# Patient Record
Sex: FEMALE | Race: WHITE | Employment: FULL TIME | URBAN - METROPOLITAN AREA
[De-identification: names, ages, dates, MRNs, and addresses within clinical notes are randomized per-mention and may not be internally consistent; named-entity substitution may affect disease eponyms.]

---

## 2017-01-09 ENCOUNTER — HOSPITAL ENCOUNTER (EMERGENCY)
Facility: CLINIC | Age: 47
Discharge: HOME | End: 2017-01-09
Attending: FAMILY MEDICINE | Admitting: FAMILY MEDICINE
Payer: COMMERCIAL

## 2017-01-09 VITALS — DIASTOLIC BLOOD PRESSURE: 83 MMHG | TEMPERATURE: 97.8 F | OXYGEN SATURATION: 97 % | SYSTOLIC BLOOD PRESSURE: 135 MMHG

## 2017-01-09 DIAGNOSIS — J32.9 SINUSITIS, UNSPECIFIED CHRONICITY, UNSPECIFIED LOCATION: ICD-10-CM

## 2017-01-09 PROCEDURE — 99212 OFFICE O/P EST SF 10 MIN: CPT

## 2017-01-09 PROCEDURE — 99213 OFFICE O/P EST LOW 20 MIN: CPT | Performed by: FAMILY MEDICINE

## 2017-01-09 RX ORDER — CIPROFLOXACIN 500 MG/1
500 TABLET, FILM COATED ORAL 2 TIMES DAILY
Qty: 28 TABLET | Refills: 0 | Status: SHIPPED | OUTPATIENT
Start: 2017-01-09 | End: 2017-01-23

## 2017-01-09 ASSESSMENT — ENCOUNTER SYMPTOMS
COUGH: 1
FACIAL SWELLING: 1

## 2017-01-09 NOTE — ED PROVIDER NOTES
History     Chief Complaint   Patient presents with     Facial Pain     HPI  Malka Ontiveros is a 46 year old female, past medical history is unremarkable, presents to the urgent care with approximate 7-10 days of facial fullness congestion, rhinorrhea especially in the a.m., sensation of postnasal drip.  Mild cough, no shortness of air no chest pain.     Active Ambulatory Problems     Diagnosis Date Noted     No Active Ambulatory Problems     Resolved Ambulatory Problems     Diagnosis Date Noted     No Resolved Ambulatory Problems     No Additional Past Medical History     History reviewed. No pertinent past surgical history.  Social History     Social History     Marital Status:      Spouse Name: N/A     Number of Children: N/A     Years of Education: N/A     Occupational History     Not on file.     Social History Main Topics     Smoking status: Current Every Day Smoker -- 0.50 packs/day     Smokeless tobacco: Never Used     Alcohol Use: Yes     Drug Use: No     Sexual Activity: Not on file     Other Topics Concern     Not on file     Social History Narrative     History reviewed. No pertinent family history.  No current facility-administered medications for this encounter.     Current Outpatient Prescriptions   Medication     ciprofloxacin (CIPRO) 500 MG tablet     albuterol (ALBUTEROL) 108 (90 BASE) MCG/ACT inhaler     NO ACTIVE MEDICATIONS      No Known Allergies    I have reviewed the Medications, Allergies, Past Medical and Surgical History, and Social History in the Epic system.    Review of Systems   HENT: Positive for congestion and facial swelling.    Respiratory: Positive for cough.    All other systems reviewed and are negative.      Physical Exam   BP: 135/83 mmHg  Heart Rate: 77  Temp: 97.8  F (36.6  C)  SpO2: 97 %  Physical Exam   Constitutional: She appears well-developed and well-nourished.   HENT:   Tenderness to palpation over the ethmoid/sphenoid area as well as the left maxillary  prominence   Eyes: Conjunctivae and EOM are normal. Pupils are equal, round, and reactive to light.   Neck: Normal range of motion. Neck supple.   Cardiovascular: Normal rate, regular rhythm, normal heart sounds and intact distal pulses.    Pulmonary/Chest: Effort normal and breath sounds normal.   Nursing note and vitals reviewed.      ED Course   Procedures             Critical Care time:  none               Labs Ordered and Resulted from Time of ED Arrival Up to the Time of Departure from the ED - No data to display    Assessments & Plan (with Medical Decision Making)   46-year-old female who presents with URI-type symptoms as described in the HPI.  Physical exam is consistent with sinusitis.  Plan for Cipro 500 twice a day ×2 weeks.  Push fluids, continue other supportive symptomatic measures like María Elena pot that she has been doing as well as oral decongestant.    Disclaimer: This note consists of symbols derived from keyboarding, dictation and/or voice recognition software. As a result, there may be errors in the script that have gone undetected. Please consider this when interpreting information found in this chart.      I have reviewed the nursing notes.    I have reviewed the findings, diagnosis, plan and need for follow up with the patient.    New Prescriptions    CIPROFLOXACIN (CIPRO) 500 MG TABLET    Take 1 tablet (500 mg) by mouth 2 times daily for 14 days       Final diagnoses:   Sinusitis, unspecified chronicity, unspecified location       1/9/2017   [facility] EMERGENCY DEPARTMENT      Francisco Nelson MD  01/09/17 8350

## 2017-01-09 NOTE — DISCHARGE INSTRUCTIONS
Understanding Sinus Problems    You don t often think about your sinuses until there s a problem. One day you realize you can t smell dinner cooking. Or you find you often have problems breathing through your nose.  Symptoms of sinus problems  Sinus problems can cause uncomfortable symptoms. Your nose may run constantly. You might have trouble sleeping at night. You may even lose your sense of smell. Other symptoms can include:    Nasal congestion    Fullness in ears    Green, yellow, or bloody drainage from the nose    Trouble tasting food    Frequent headaches    Facial pain    Cough  When sinuses are blocked  If something blocks the passages in the nose or sinuses, mucus can t drain. Mucus-filled sinuses often become infected.    Colds cause the lining of the nose and sinuses to swell and make extra mucus. A buildup of mucus can lead to a more serious infection.    Allergies irritate turbinates and other tissues. This causes swelling, which can cause a blockage. Over time, this irritation can also lead to sacs of swollen tissue (polyps).    Polyps may form in both the sinuses and nose. Polyps can grow large enough to clog nasal passages and block drainage.    A crooked (deviated) septum may block nasal passages. This is often the result of an injury.    0299-4208 [employer]. [address] [place] [address], [place] [address]. All rights reserved. This information is not intended as a substitute for professional medical care. Always follow your healthcare professional's instructions.      Cipro 500 mg twice daily ×14 days.  Continue other symptomatic measures.

## 2017-01-09 NOTE — ED AVS SNAPSHOT
[facility] Emergency Department    [address] [address]    Phone:  [phone]    Fax:  [phone]                                       Malka Ontiveros   MRN: [id number]    Department:  [facility] Emergency Department   Date of Visit:  1/9/2017           Patient Information     Date Of Birth          1970        Your diagnoses for this visit were:     Sinusitis, unspecified chronicity, unspecified location        You were seen by Francisco Nelson MD.      Follow-up Information     Schedule an appointment as soon as possible for a visit with Unassigned Prisca Marr MD.    Specialty:  Clinic    Why:  As needed, If symptoms worsen        Discharge Instructions         Understanding Sinus Problems    You don t often think about your sinuses until there s a problem. One day you realize you can t smell dinner cooking. Or you find you often have problems breathing through your nose.  Symptoms of sinus problems  Sinus problems can cause uncomfortable symptoms. Your nose may run constantly. You might have trouble sleeping at night. You may even lose your sense of smell. Other symptoms can include:    Nasal congestion    Fullness in ears    Green, yellow, or bloody drainage from the nose    Trouble tasting food    Frequent headaches    Facial pain    Cough  When sinuses are blocked  If something blocks the passages in the nose or sinuses, mucus can t drain. Mucus-filled sinuses often become infected.    Colds cause the lining of the nose and sinuses to swell and make extra mucus. A buildup of mucus can lead to a more serious infection.    Allergies irritate turbinates and other tissues. This causes swelling, which can cause a blockage. Over time, this irritation can also lead to sacs of swollen tissue (polyps).    Polyps may form in both the sinuses and nose. Polyps can grow large enough to clog nasal passages and block drainage.    A crooked (deviated) septum may block  nasal passages. This is often the result of an injury.    1289-3408 [employer]. [address] [place] [address], [place] [address]. All rights reserved. This information is not intended as a substitute for professional medical care. Always follow your healthcare professional's instructions.      Cipro 500 mg twice daily ×14 days.  Continue other symptomatic measures.        24 Hour Appointment Hotline       To make an appointment at any [facility], call [phone] ([phone]). If you don't have a family doctor or clinic, we will help you find one. [facility] are conveniently located to serve the needs of you and your family.             Review of your medicines      START taking        Dose / Directions Last dose taken    ciprofloxacin 500 MG tablet   Commonly known as:  CIPRO   Dose:  500 mg   Quantity:  28 tablet        Take 1 tablet (500 mg) by mouth 2 times daily for 14 days   Refills:  0          Our records show that you are taking the medicines listed below. If these are incorrect, please call your family doctor or clinic.        Dose / Directions Last dose taken    albuterol 108 (90 BASE) MCG/ACT Inhaler   Commonly known as:  albuterol   Dose:  2 puff   Quantity:  1 Inhaler        Inhale 2 puffs into the lungs every 4 hours as needed for shortness of breath / dyspnea   Refills:  0        NO ACTIVE MEDICATIONS        Refills:  0                Prescriptions were sent or printed at these locations (1 Prescription)                   [facility] [address] [place] [address]   [address] [place] [address]    Telephone:  [phone]   Fax:  [phone]   Hours:                  E-Prescribed (1 of 1)         ciprofloxacin (CIPRO) 500 MG tablet                Orders Needing Specimen Collection     None      Pending Results     No orders found from 1/8/2017 to 1/10/2017.            Pending Culture Results     No orders found from 1/8/2017 to 1/10/2017.           "   Test Results from your hospital stay            Thank you for choosing [facility]       Thank you for choosing [facility] for your care. Our goal is always to provide you with excellent care. Hearing back from our patients is one way we can continue to improve our services. Please take a few minutes to complete the written survey that you may receive in the mail after you visit with us. Thank you!        [facility] Information     [facility] lets you send messages to your doctor, view your test results, renew your prescriptions, schedule appointments and more. To sign up, go to [email or web address] . Click on \"Log in\" on the left side of the screen, which will take you to the Welcome page. Then click on \"Sign up Now\" on the right side of the page.     You will be asked to enter the access code listed below, as well as some personal information. Please follow the directions to create your username and password.     Your access code is: [email or web address]  Expires: 2017  3:33 PM     Your access code will  in 90 days. If you need help or a new code, please call your [facility] clinic or [phone].        Care EveryWhere ID     This is your Care EveryWhere ID. This could be used by other organizations to access your [facility] medical records  [id number]        After Visit Summary       This is your record. Keep this with you and show to your community pharmacist(s) and doctor(s) at your next visit.                  "

## 2017-01-09 NOTE — ED AVS SNAPSHOT
[facility] Emergency Department    [address] [address]    Phone:  [phone]    Fax:  [phone]                                       Malka Ontiveros   MRN: [id number]    Department:  [facility] Emergency Department   Date of Visit:  1/9/2017           After Visit Summary Signature Page     I have received my discharge instructions, and my questions have been answered. I have discussed any challenges I see with this plan with the nurse or doctor.    ..........................................................................................................................................  Patient/Patient Representative Signature      ..........................................................................................................................................  Patient Representative Print Name and Relationship to Patient    ..................................................               ................................................  Date                                            Time    ..........................................................................................................................................  Reviewed by Signature/Title    ...................................................              ..............................................  Date                                                            Time

## 2017-02-13 ENCOUNTER — OFFICE VISIT (OUTPATIENT)
Dept: FAMILY MEDICINE | Facility: CLINIC | Age: 47
End: 2017-02-13
Payer: COMMERCIAL

## 2017-02-13 ENCOUNTER — HOSPITAL ENCOUNTER (OUTPATIENT)
Dept: CT IMAGING | Facility: CLINIC | Age: 47
Discharge: HOME | End: 2017-02-13
Attending: INTERNAL MEDICINE | Admitting: INTERNAL MEDICINE
Payer: COMMERCIAL

## 2017-02-13 VITALS
WEIGHT: 181.6 LBS | BODY MASS INDEX: 35.65 KG/M2 | HEIGHT: 60 IN | HEART RATE: 70 BPM | TEMPERATURE: 98.2 F | DIASTOLIC BLOOD PRESSURE: 63 MMHG | OXYGEN SATURATION: 99 % | SYSTOLIC BLOOD PRESSURE: 135 MMHG

## 2017-02-13 DIAGNOSIS — M54.50 LOW BACK PAIN RADIATING TO RIGHT LEG: ICD-10-CM

## 2017-02-13 DIAGNOSIS — R10.84 ABDOMINAL PAIN, GENERALIZED: Primary | ICD-10-CM

## 2017-02-13 DIAGNOSIS — R10.84 ABDOMINAL PAIN, GENERALIZED: ICD-10-CM

## 2017-02-13 DIAGNOSIS — M79.604 LOW BACK PAIN RADIATING TO RIGHT LEG: ICD-10-CM

## 2017-02-13 LAB
ALBUMIN SERPL-MCNC: 4 G/DL (ref 3.4–5)
ALBUMIN UR-MCNC: NEGATIVE MG/DL
ALP SERPL-CCNC: 62 U/L (ref 40–150)
ALT SERPL W P-5'-P-CCNC: 28 U/L (ref 0–50)
ANION GAP SERPL CALCULATED.3IONS-SCNC: 9 MMOL/L (ref 3–14)
APPEARANCE UR: CLEAR
AST SERPL W P-5'-P-CCNC: 18 U/L (ref 0–45)
BILIRUB SERPL-MCNC: 0.9 MG/DL (ref 0.2–1.3)
BILIRUB UR QL STRIP: NEGATIVE
BUN SERPL-MCNC: 13 MG/DL (ref 7–30)
CALCIUM SERPL-MCNC: 8.6 MG/DL (ref 8.5–10.1)
CHLORIDE SERPL-SCNC: 104 MMOL/L (ref 94–109)
CO2 SERPL-SCNC: 22 MMOL/L (ref 20–32)
COLOR UR AUTO: YELLOW
CREAT SERPL-MCNC: 0.59 MG/DL (ref 0.52–1.04)
ERYTHROCYTE [DISTWIDTH] IN BLOOD BY AUTOMATED COUNT: 13.4 % (ref 10–15)
GFR SERPL CREATININE-BSD FRML MDRD: NORMAL ML/MIN/1.7M2
GLUCOSE SERPL-MCNC: 87 MG/DL (ref 70–99)
GLUCOSE UR STRIP-MCNC: NEGATIVE MG/DL
HCT VFR BLD AUTO: 37.8 % (ref 35–47)
HGB BLD-MCNC: 12.8 G/DL (ref 11.7–15.7)
HGB UR QL STRIP: NEGATIVE
KETONES UR STRIP-MCNC: NEGATIVE MG/DL
LEUKOCYTE ESTERASE UR QL STRIP: ABNORMAL
MCH RBC QN AUTO: 29.4 PG (ref 26.5–33)
MCHC RBC AUTO-ENTMCNC: 33.9 G/DL (ref 31.5–36.5)
MCV RBC AUTO: 87 FL (ref 78–100)
NITRATE UR QL: NEGATIVE
NON-SQ EPI CELLS #/AREA URNS LPF: NORMAL /LPF
PH UR STRIP: 5.5 PH (ref 5–7)
PLATELET # BLD AUTO: 270 10E9/L (ref 150–450)
POTASSIUM SERPL-SCNC: 3.7 MMOL/L (ref 3.4–5.3)
PROT SERPL-MCNC: 8 G/DL (ref 6.8–8.8)
RBC # BLD AUTO: 4.35 10E12/L (ref 3.8–5.2)
RBC #/AREA URNS AUTO: NORMAL /HPF (ref 0–2)
SODIUM SERPL-SCNC: 135 MMOL/L (ref 133–144)
SP GR UR STRIP: 1.02 (ref 1–1.03)
URN SPEC COLLECT METH UR: ABNORMAL
UROBILINOGEN UR STRIP-ACNC: 0.2 EU/DL (ref 0.2–1)
WBC # BLD AUTO: 10.8 10E9/L (ref 4–11)
WBC #/AREA URNS AUTO: NORMAL /HPF (ref 0–2)

## 2017-02-13 PROCEDURE — 25500064 ZZH RX 255 OP 636: Performed by: INTERNAL MEDICINE

## 2017-02-13 PROCEDURE — 99203 OFFICE O/P NEW LOW 30 MIN: CPT | Performed by: INTERNAL MEDICINE

## 2017-02-13 PROCEDURE — 36415 COLL VENOUS BLD VENIPUNCTURE: CPT | Performed by: INTERNAL MEDICINE

## 2017-02-13 PROCEDURE — 85027 COMPLETE CBC AUTOMATED: CPT | Performed by: INTERNAL MEDICINE

## 2017-02-13 PROCEDURE — 74177 CT ABD & PELVIS W/CONTRAST: CPT

## 2017-02-13 PROCEDURE — 80053 COMPREHEN METABOLIC PANEL: CPT | Performed by: INTERNAL MEDICINE

## 2017-02-13 PROCEDURE — 81001 URINALYSIS AUTO W/SCOPE: CPT | Performed by: INTERNAL MEDICINE

## 2017-02-13 PROCEDURE — 25000125 ZZHC RX 250: Performed by: INTERNAL MEDICINE

## 2017-02-13 RX ORDER — IOPAMIDOL 755 MG/ML
89 INJECTION, SOLUTION INTRAVASCULAR ONCE
Status: COMPLETED | OUTPATIENT
Start: 2017-02-13 | End: 2017-02-13

## 2017-02-13 RX ORDER — CYCLOBENZAPRINE HCL 5 MG
5 TABLET ORAL 3 TIMES DAILY PRN
Qty: 42 TABLET | Refills: 1 | Status: SHIPPED | OUTPATIENT
Start: 2017-02-13 | End: 2018-08-24

## 2017-02-13 RX ADMIN — SODIUM CHLORIDE 62 ML: 9 INJECTION, SOLUTION INTRAVENOUS at 15:30

## 2017-02-13 RX ADMIN — IOPAMIDOL 89 ML: 755 INJECTION, SOLUTION INTRAVENOUS at 15:30

## 2017-02-13 NOTE — PATIENT INSTRUCTIONS
We will start with checking some lab work and the CT scan of the abdomen.  If these are normal, we may next consider an MRI of the back and/or a colonoscopy.

## 2017-02-13 NOTE — PROGRESS NOTES
"  SUBJECTIVE:                                                    Malka Ontiveros is a 47 year old female who presents to clinic today for the following health issues:      ABDOMINAL and FLANK PAIN     Onset: x 3 days    Description:   Character: Sharp pain suprapubic at the onset,  And now dull ache in abdomin and rectally   Location: suprapubic region right flank.   Has had on-going back pain for years, this new abdominal pain has made that worse.   Radiation: Back and rectum     Intensity: 7/10 now, 10/10 at the onset.     Progression of Symptoms:  same and constant    Accompanying Signs & Symptoms:  Fever/Chills?: no   Gas/Bloating: no   Nausea: no   Vomitting: no   Diarrhea?: no   Constipation:no   Dysuria or Hematuria: YES - needed to \"push\" first urination at the onset of symptoms.   Has seen some blood in stool, slight amount recently and has some discomfort with BMs   History:   Trauma: no   Previous similar pain: no    Previous tests done: none    Precipitating factors:   Does the pain change with:     Food: no      BM: YES- some pain w/ Bowel movements, some history of hemorrhoid flare up.      Urination: no     Alleviating factors:  Certain sitting positions     Therapies Tried and outcome: ibuprofen - helped some.     LMP:  2 weeks ago        Back Pain       Description:   Location of pain:  left  Character of pain: sharp and dull ache  Pain radiation: radiates into the right buttocks  New numbness or weakness in legs, not attributed to pain:  no     Intensity: severe    History:   She's had right lower back pain for many months and it has been worse recently.  Uses ibuprofen prn.  Parents have a history of cancer, so she worries about getting cancer.       Accompanying Signs & Symptoms:  Risk of Fracture:  None  Risk of Cauda Equina:  None  Risk of Infection:  None  Risk of Cancer:  None           Problem list and histories reviewed & adjusted, as indicated.  Additional history: as " documented    Current Outpatient Prescriptions   Medication Sig Dispense Refill     cyclobenzaprine (FLEXERIL) 5 MG tablet Take 1 tablet (5 mg) by mouth 3 times daily as needed for muscle spasms 42 tablet 1     albuterol (ALBUTEROL) 108 (90 BASE) MCG/ACT inhaler Inhale 2 puffs into the lungs every 4 hours as needed for shortness of breath / dyspnea 1 Inhaler 0     NO ACTIVE MEDICATIONS        No Known Allergies    ROS:  Constitutional, MSK, gi and gu systems are negative, except as otherwise noted.    OBJECTIVE:                                                    /63  Pulse 70  Temp 98.2  F (36.8  C) (Tympanic)  Ht 5' (1.524 m)  Wt 181 lb 9.6 oz (82.4 kg)  SpO2 99%  BMI 35.47 kg/m2  Body mass index is 35.47 kg/(m^2).    GENERAL: healthy, alert and no distress  RESP: lungs clear to auscultation - no rales, rhonchi or wheezes  CV: regular rate and rhythm, normal S1 S2, no S3 or S4, no murmur, click or rub  ABDOMEN: soft, tender over the entire lower abdomen, no hepatosplenomegaly, no masses and bowel sounds normal  BACK: no CVA tenderness, Pain to palpation over lumbar spine and in right low back and upper right buttock  NEURO: Normal strength and tone in legs, normal light touch sensation, symmetric Patellar reflexes      Diagnostic Test Results:  Results for orders placed or performed in visit on 02/13/17 (from the past 24 hour(s))   *UA reflex to Microscopic and Culture ([facility] and [facility] (except [place] and [place])   Result Value Ref Range    Color Urine Yellow     Appearance Urine Clear     Glucose Urine Negative NEG mg/dL    Bilirubin Urine Negative NEG    Ketones Urine Negative NEG mg/dL    Specific Gravity Urine 1.020 1.003 - 1.035    Blood Urine Negative NEG    pH Urine 5.5 5.0 - 7.0 pH    Protein Albumin Urine Negative NEG mg/dL    Urobilinogen Urine 0.2 0.2 - 1.0 EU/dL    Nitrite Urine Negative NEG    Leukocyte Esterase Urine Trace (A) NEG    Source Midstream Urine    Urine  Microscopic   Result Value Ref Range    WBC Urine O - 2 0 - 2 /HPF    RBC Urine O - 2 0 - 2 /HPF    Squamous Epithelial /LPF Urine Few FEW /LPF        ASSESSMENT/PLAN:                                                        1. Lower abdominal pain    She has moderate tenderness in the entire lower abdomen on exam.  Had had some blood in the stool, but has a history of hemorrhoids so this is the most likely etiology.  Will check CBC, CMP, and CT.  If normal, may consider colonoscopy for further eval of hematochezia.     - *UA reflex to Microscopic and Culture ([facility] and [facility] (except [place] and [place])  - Urine Microscopic  - Comprehensive metabolic panel  - CBC with platelets  - CT Abdomen Pelvis w Contrast; Future    2. Low back pain radiating to right leg    Likely due to sciatica.  Will try some Flexeril to see if muscle spasm contributing.  If not improving, would check lumbar MRI.  I discussed waiting a month prior to proceeding with MRI, but she would like it done immediately.  Asked her to at least give the Flexeril a try for a few days.    - cyclobenzaprine (FLEXERIL) 5 MG tablet; Take 1 tablet (5 mg) by mouth 3 times daily as needed for muscle spasms  Dispense: 42 tablet; Refill: 1      Micah Mcdowell MD  [facility]

## 2017-02-13 NOTE — NURSING NOTE
Chief Complaint   Patient presents with     Abdominal Pain     x 3 days,        Initial /63  Pulse 70  Temp 98.2  F (36.8  C) (Tympanic)  Ht 5' (1.524 m)  Wt 181 lb 9.6 oz (82.4 kg)  SpO2 99%  BMI 35.47 kg/m2 Estimated body mass index is 35.47 kg/(m^2) as calculated from the following:    Height as of this encounter: 5' (1.524 m).    Weight as of this encounter: 181 lb 9.6 oz (82.4 kg).  Medication Reconciliation: paulino GARCIA CMA (AAMA)

## 2017-02-13 NOTE — LETTER
[facility]  [address] [place] [address]  Phone: [phone]    February 13, 2017    Malka Ontiveros  [address] [place] [address]          Dear Ms. Ontiveros,    The results of your recent lab tests were within normal limits.   Labs were normal (blood counts, electrolytes, kidney and liver function).   Component      Latest Ref Rng & Units 2/13/2017   Sodium      133 - 144 mmol/L 135   Potassium      3.4 - 5.3 mmol/L 3.7   Chloride      94 - 109 mmol/L 104   Carbon Dioxide      20 - 32 mmol/L 22   Anion Gap      3 - 14 mmol/L 9   Glucose      70 - 99 mg/dL 87   Urea Nitrogen      7 - 30 mg/dL 13   Creatinine      0.52 - 1.04 mg/dL 0.59   GFR Estimate      >60 mL/min/1.7m2 >90 . . .   GFR Estimate If Black      >60 mL/min/1.7m2 >90 . . .   Calcium      8.5 - 10.1 mg/dL 8.6   Bilirubin Total      0.2 - 1.3 mg/dL 0.9   Albumin      3.4 - 5.0 g/dL 4.0   Protein Total      6.8 - 8.8 g/dL 8.0   Alkaline Phosphatase      40 - 150 U/L 62   ALT      0 - 50 U/L 28   AST      0 - 45 U/L 18   WBC      4.0 - 11.0 10e9/L 10.8   RBC Count      3.8 - 5.2 10e12/L 4.35   Hemoglobin      11.7 - 15.7 g/dL 12.8   Hematocrit      35.0 - 47.0 % 37.8   MCV      78 - 100 fl 87   MCH      26.5 - 33.0 pg 29.4   MCHC      31.5 - 36.5 g/dL 33.9   RDW      10.0 - 15.0 % 13.4   Platelet Count      150 - 450 10e9/L 270     If you have any further questions or problems, please contact our office.    Sincerely,      Micah Mcdowell MD/ [place]

## 2017-02-13 NOTE — MR AVS SNAPSHOT
After Visit Summary   2/13/2017    Malka Ontiveros    MRN: [id number]           Patient Information     Date Of Birth          1970        Visit Information        Provider Department      2/13/2017 9:20 AM Micah Mcdowell MD [facility]        Today's Diagnoses     Lower abdominal pain    -  1    Low back pain radiating to right leg          Care Instructions    We will start with checking some lab work and the CT scan of the abdomen.  If these are normal, we may next consider an MRI of the back and/or a colonoscopy.        Follow-ups after your visit        Your next 10 appointments already scheduled     Feb 13, 2017  3:15 PM CST   CT ABDOMEN PELVIS W CONTRAST with WYCT1   [facility] ([facility])    [address] [address] [phone]           Please bring any scans or X-rays taken at other hospitals, if similar tests were done. Also bring a list of your medicines, including vitamins, minerals and over-the-counter drugs. It is safest to leave personal items at home.  Be sure to tell your doctor:   If you have any allergies.   If there s any chance you are pregnant.   If you are breastfeeding.   If you have any special needs.  You may have contrast for this exam. To prepare:   Do not eat or drink for 2 hours before your exam. If you need to take medicine, you may take it with small sips of water. (We may ask you to take liquid medicine as well.)   The day before your exam, drink extra fluids at least six 8-ounce glasses (unless your doctor tells you to restrict your fluids).  Patients over 70 or patients with diabetes or kidney problems:   If you haven t had a blood test (creatinine test) within the last 30 days, go to your clinic or Diagnostic Imaging Department for this test.  If you have diabetes:   If your kidney function is normal, continue taking your metformin (Avandamet, Glucophage, Glucovance, Metaglip) on the day of your  "exam.   If your kidney function is abnormal, wait 48 hours before restarting this medicine.  You will have oral contrast for this exam:   You will drink the contrast at home. Get this from your clinic or Diagnostic Imaging Department. Please follow the directions given.  Please wear loose clothing, such as a sweat suit or jogging clothes. Avoid snaps, zippers and other metal. We may ask you to undress and put on a hospital gown.  If you have any questions, please call the Imaging Department where you will have your exam.              Future tests that were ordered for you today     Open Future Orders        Priority Expected Expires Ordered    CT Abdomen Pelvis w Contrast Routine  2/13/2018 2/13/2017            Who to contact     If you have questions or need follow up information about today's clinic visit or your schedule please contact [facility] directly at [phone].  Normal or non-critical lab and imaging results will be communicated to you by Uniqueduhart, letter or phone within 4 business days after the clinic has received the results. If you do not hear from us within 7 days, please contact the clinic through Uniqueduhart or phone. If you have a critical or abnormal lab result, we will notify you by phone as soon as possible.  Submit refill requests through Hamstersoft or call your pharmacy and they will forward the refill request to us. Please allow 3 business days for your refill to be completed.          Additional Information About Your Visit        UniqueduharQuote Roller Information     Hamstersoft lets you send messages to your doctor, view your test results, renew your prescriptions, schedule appointments and more. To sign up, go to [email or web address] . Click on \"Log in\" on the left side of the screen, which will take you to the Welcome page. Then click on \"Sign up Now\" on the right side of the page.     You will be asked to enter the access code listed below, as well as some personal information. Please " follow the directions to create your username and password.     Your access code is: [email or web address]  Expires: 2017  3:33 PM     Your access code will  in 90 days. If you need help or a new code, please call your [place] clinic or [phone].        Care EveryWhere ID     This is your Care EveryWhere ID. This could be used by other organizations to access your [place] medical records  [id number]        Your Vitals Were     Pulse Temperature Height Pulse Oximetry BMI (Body Mass Index)       70 98.2  F (36.8  C) (Tympanic) 5' (1.524 m) 99% 35.47 kg/m2        Blood Pressure from Last 3 Encounters:   17 135/63   17 135/83   10/03/16 (!) 145/91    Weight from Last 3 Encounters:   17 181 lb 9.6 oz (82.4 kg)              We Performed the Following     *UA reflex to Microscopic and Culture ([facility] and [facility] (except [place] and [place])     CBC with platelets     Comprehensive metabolic panel     Urine Microscopic          Today's Medication Changes          These changes are accurate as of: 17 10:32 AM.  If you have any questions, ask your nurse or doctor.               Start taking these medicines.        Dose/Directions    cyclobenzaprine 5 MG tablet   Commonly known as:  FLEXERIL   Used for:  Low back pain radiating to right leg   Started by:  Micah Mcdowell MD        Dose:  5 mg   Take 1 tablet (5 mg) by mouth 3 times daily as needed for muscle spasms   Quantity:  42 tablet   Refills:  1            Where to get your medicines      These medications were sent to [facility] [address]  [address] [place] [address]     Phone:  [phone]     cyclobenzaprine 5 MG tablet                Primary Care Provider    Clinic Unassigned Carter Stafford MD       No address on file        Thank you!     Thank you for choosing [facility]  for your care. Our goal is always to provide you with excellent care. Hearing back  from our patients is one way we can continue to improve our services. Please take a few minutes to complete the written survey that you may receive in the mail after your visit with us. Thank you!             Your Updated Medication List - Protect others around you: Learn how to safely use, store and throw away your medicines at www.disposemymeds.org.          This list is accurate as of: 2/13/17 10:32 AM.  Always use your most recent med list.                   Brand Name Dispense Instructions for use    albuterol 108 (90 BASE) MCG/ACT Inhaler    albuterol    1 Inhaler    Inhale 2 puffs into the lungs every 4 hours as needed for shortness of breath / dyspnea       cyclobenzaprine 5 MG tablet    FLEXERIL    42 tablet    Take 1 tablet (5 mg) by mouth 3 times daily as needed for muscle spasms       NO ACTIVE MEDICATIONS

## 2017-02-14 ENCOUNTER — TELEPHONE (OUTPATIENT)
Dept: FAMILY MEDICINE | Facility: CLINIC | Age: 47
End: 2017-02-14

## 2017-02-14 DIAGNOSIS — R10.84 ABDOMINAL PAIN, GENERALIZED: Primary | ICD-10-CM

## 2017-02-14 DIAGNOSIS — M54.41 ACUTE BILATERAL LOW BACK PAIN WITH RIGHT-SIDED SCIATICA: ICD-10-CM

## 2017-02-14 DIAGNOSIS — M54.42 ACUTE BILATERAL LOW BACK PAIN WITH LEFT-SIDED SCIATICA: ICD-10-CM

## 2017-02-14 RX ORDER — TRAMADOL HYDROCHLORIDE 50 MG/1
50-100 TABLET ORAL EVERY 6 HOURS PRN
Qty: 20 TABLET | Refills: 0 | Status: SHIPPED | OUTPATIENT
Start: 2017-02-14 | End: 2018-08-24

## 2017-02-14 NOTE — TELEPHONE ENCOUNTER
"I called her back and reviewed the CT report/ result note per her request, (see note below) .     \"I don't need another prescription , I need to know what is wrong with my back, \"  \"the pain in my back is worse today, I woke up like that. \" \"the first pill just made me sleepy. \"  \"Do I need to see a back doctor, or what do I do next? \"  \"would the cyst be pushing on a nerve? \"    She is at work, stands, and walks at a computer and says she \" has to be at work. \"    Dr Mcdowell, what to recommend?   Haley Grewal RNC            "

## 2017-02-14 NOTE — TELEPHONE ENCOUNTER
We can try some tramadol for the pain.  I have placed a prescription in my office outbasket to be faxed over.  If still not improved in a few days, she should let us know.     Micah Mcdowell MD

## 2017-02-14 NOTE — TELEPHONE ENCOUNTER
Left detailed message per her ok we do so below. Advised Dr Mcdowell has ordered the MRI of her back and she can call [phone] to schedule.   Haley Grewal RNC

## 2017-02-14 NOTE — TELEPHONE ENCOUNTER
Back MRI ordered though I feel that this is premature- please let her know that she can schedule it.  Thanks.    Micah Mcdowell MD

## 2017-02-14 NOTE — TELEPHONE ENCOUNTER
CSS, please call her/ advise her per below when you have the tramadol hard copy Rx. Thanks,   Haley Grewal RNC

## 2017-02-14 NOTE — TELEPHONE ENCOUNTER
Reason for Call:  Other more pain today    Detailed comments: pt calling stating she was seen yesterday, had a ct and diagnosed with a left ovarian cyst. She is in more pain today than she was yesterday when she see Dr Mcdowell. She states she know she wanted her to try the flexeril for a few days but she said all that does is make her tired and does nothing for the pain. She said today the pain is on the right side as well. She is wondering what she should do.    Phone Number Patient can be reached at: Cell number on file:    Telephone Information:   Mobile [phone]       Best Time: any    Can we leave a detailed message on this number? YES    Call taken on 2/14/2017 at 9:36 AM by Kirsty Davis

## 2017-02-14 NOTE — TELEPHONE ENCOUNTER
Called pt and let her know that her Rx is at  at is ready for    And she is wanting to speak to RN cause she has lots of questions regarding   Her CT and size of cysts and could this be the cause of pain?   Pt does not know if she wants medication but it is at .    [facility] [place]

## 2017-02-17 ENCOUNTER — HOSPITAL ENCOUNTER (OUTPATIENT)
Dept: MRI IMAGING | Facility: CLINIC | Age: 47
Discharge: HOME | End: 2017-02-17
Attending: INTERNAL MEDICINE | Admitting: INTERNAL MEDICINE
Payer: COMMERCIAL

## 2017-02-17 DIAGNOSIS — M54.41 ACUTE BILATERAL LOW BACK PAIN WITH RIGHT-SIDED SCIATICA: ICD-10-CM

## 2017-02-17 PROCEDURE — 72148 MRI LUMBAR SPINE W/O DYE: CPT

## 2017-07-26 NOTE — TELEPHONE ENCOUNTER
Tramadol rx from 2/14/17 never picked up from . Paper rx was torn up and shredded. Angus GARCÍA CMA

## 2018-08-24 ENCOUNTER — HOSPITAL ENCOUNTER (EMERGENCY)
Facility: CLINIC | Age: 48
Discharge: HOME | End: 2018-08-24
Attending: NURSE PRACTITIONER | Admitting: NURSE PRACTITIONER
Payer: COMMERCIAL

## 2018-08-24 VITALS
RESPIRATION RATE: 12 BRPM | WEIGHT: 140 LBS | SYSTOLIC BLOOD PRESSURE: 174 MMHG | OXYGEN SATURATION: 100 % | HEIGHT: 60 IN | DIASTOLIC BLOOD PRESSURE: 94 MMHG | BODY MASS INDEX: 27.48 KG/M2 | HEART RATE: 82 BPM | TEMPERATURE: 97.3 F

## 2018-08-24 DIAGNOSIS — R19.7 DIARRHEA, UNSPECIFIED TYPE: ICD-10-CM

## 2018-08-24 DIAGNOSIS — R42 DIZZINESS: ICD-10-CM

## 2018-08-24 LAB
ALBUMIN UR-MCNC: NEGATIVE MG/DL
ANION GAP SERPL CALCULATED.3IONS-SCNC: 5 MMOL/L (ref 3–14)
APPEARANCE UR: CLEAR
BACTERIA #/AREA URNS HPF: ABNORMAL /HPF
BASOPHILS # BLD AUTO: 0.1 10E9/L (ref 0–0.2)
BASOPHILS NFR BLD AUTO: 0.6 %
BILIRUB UR QL STRIP: NEGATIVE
BUN SERPL-MCNC: 10 MG/DL (ref 7–30)
CALCIUM SERPL-MCNC: 8.9 MG/DL (ref 8.5–10.1)
CHLORIDE SERPL-SCNC: 109 MMOL/L (ref 94–109)
CO2 SERPL-SCNC: 26 MMOL/L (ref 20–32)
COLOR UR AUTO: ABNORMAL
CREAT SERPL-MCNC: 0.57 MG/DL (ref 0.52–1.04)
DIFFERENTIAL METHOD BLD: NORMAL
EOSINOPHIL # BLD AUTO: 0.2 10E9/L (ref 0–0.7)
EOSINOPHIL NFR BLD AUTO: 2.5 %
ERYTHROCYTE [DISTWIDTH] IN BLOOD BY AUTOMATED COUNT: 12.5 % (ref 10–15)
GFR SERPL CREATININE-BSD FRML MDRD: >90 ML/MIN/1.7M2
GLUCOSE SERPL-MCNC: 91 MG/DL (ref 70–99)
GLUCOSE UR STRIP-MCNC: NEGATIVE MG/DL
HCG UR QL: NEGATIVE
HCT VFR BLD AUTO: 41.5 % (ref 35–47)
HGB BLD-MCNC: 14 G/DL (ref 11.7–15.7)
HGB UR QL STRIP: NEGATIVE
IMM GRANULOCYTES # BLD: 0 10E9/L (ref 0–0.4)
IMM GRANULOCYTES NFR BLD: 0.5 %
KETONES UR STRIP-MCNC: NEGATIVE MG/DL
LEUKOCYTE ESTERASE UR QL STRIP: NEGATIVE
LYMPHOCYTES # BLD AUTO: 2.8 10E9/L (ref 0.8–5.3)
LYMPHOCYTES NFR BLD AUTO: 33.4 %
MCH RBC QN AUTO: 30.4 PG (ref 26.5–33)
MCHC RBC AUTO-ENTMCNC: 33.7 G/DL (ref 31.5–36.5)
MCV RBC AUTO: 90 FL (ref 78–100)
MONOCYTES # BLD AUTO: 0.7 10E9/L (ref 0–1.3)
MONOCYTES NFR BLD AUTO: 8 %
NEUTROPHILS # BLD AUTO: 4.6 10E9/L (ref 1.6–8.3)
NEUTROPHILS NFR BLD AUTO: 55 %
NITRATE UR QL: NEGATIVE
NRBC # BLD AUTO: 0 10*3/UL
NRBC BLD AUTO-RTO: 0 /100
PH UR STRIP: 7 PH (ref 5–7)
PLATELET # BLD AUTO: 219 10E9/L (ref 150–450)
POTASSIUM SERPL-SCNC: 3.5 MMOL/L (ref 3.4–5.3)
RBC # BLD AUTO: 4.61 10E12/L (ref 3.8–5.2)
RBC #/AREA URNS AUTO: <1 /HPF (ref 0–2)
SODIUM SERPL-SCNC: 140 MMOL/L (ref 133–144)
SOURCE: ABNORMAL
SP GR UR STRIP: 1 (ref 1–1.03)
SQUAMOUS #/AREA URNS AUTO: 2 /HPF (ref 0–1)
UROBILINOGEN UR STRIP-MCNC: 0 MG/DL (ref 0–2)
WBC # BLD AUTO: 8.4 10E9/L (ref 4–11)
WBC #/AREA URNS AUTO: <1 /HPF (ref 0–5)

## 2018-08-24 PROCEDURE — 99283 EMERGENCY DEPT VISIT LOW MDM: CPT | Mod: Z6 | Performed by: NURSE PRACTITIONER

## 2018-08-24 PROCEDURE — 85025 COMPLETE CBC W/AUTO DIFF WBC: CPT | Performed by: NURSE PRACTITIONER

## 2018-08-24 PROCEDURE — 87086 URINE CULTURE/COLONY COUNT: CPT | Performed by: NURSE PRACTITIONER

## 2018-08-24 PROCEDURE — 99283 EMERGENCY DEPT VISIT LOW MDM: CPT

## 2018-08-24 PROCEDURE — 80048 BASIC METABOLIC PNL TOTAL CA: CPT | Performed by: NURSE PRACTITIONER

## 2018-08-24 PROCEDURE — 81025 URINE PREGNANCY TEST: CPT | Performed by: NURSE PRACTITIONER

## 2018-08-24 PROCEDURE — 81001 URINALYSIS AUTO W/SCOPE: CPT | Performed by: NURSE PRACTITIONER

## 2018-08-24 PROCEDURE — 25000125 ZZHC RX 250: Performed by: EMERGENCY MEDICINE

## 2018-08-24 RX ORDER — ONDANSETRON 4 MG/1
4 TABLET, ORALLY DISINTEGRATING ORAL ONCE
Status: COMPLETED | OUTPATIENT
Start: 2018-08-24 | End: 2018-08-24

## 2018-08-24 RX ORDER — ONDANSETRON 4 MG/1
4-8 TABLET, ORALLY DISINTEGRATING ORAL EVERY 8 HOURS PRN
Qty: 10 TABLET | Refills: 0 | Status: SHIPPED | OUTPATIENT
Start: 2018-08-24 | End: 2018-08-27

## 2018-08-24 RX ADMIN — ONDANSETRON 4 MG: 4 TABLET, ORALLY DISINTEGRATING ORAL at 09:21

## 2018-08-24 ASSESSMENT — ENCOUNTER SYMPTOMS
COUGH: 0
NAUSEA: 1
ABDOMINAL PAIN: 0
BLOOD IN STOOL: 0
NERVOUS/ANXIOUS: 0
NUMBNESS: 0
DIARRHEA: 1
BACK PAIN: 0
JOINT SWELLING: 0
CONSTIPATION: 0
EYE REDNESS: 0
CONFUSION: 0
EYE PAIN: 0
DYSURIA: 0
HEMATURIA: 0
FEVER: 0
EYE DISCHARGE: 0
HEADACHES: 1
VOMITING: 0
DIFFICULTY URINATING: 0
SEIZURES: 0
WHEEZING: 0
SHORTNESS OF BREATH: 0
DIZZINESS: 1
WOUND: 0
NECK PAIN: 1
SORE THROAT: 0

## 2018-08-24 NOTE — ED NOTES
"Up at 0530 with dizziness, has had vertigo in the past and states \"it feels the same\" did go into work but unable to stay due to dizziness, and nausea. 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 Zofran given in ER. She stated last time she was sent to therapy for there vertigo. Otherwise everything has been normal, eating and drinking good, no CP, SOB. Here today with friend.   "

## 2018-08-24 NOTE — ED AVS SNAPSHOT
[facility]    [address] [address]    Phone:  [phone]    Fax:  [phone]                                       Malka Ontiveros   MRN: [id number]    Department:  [facility]   Date of Visit:  8/24/2018           After Visit Summary Signature Page     I have received my discharge instructions, and my questions have been answered. I have discussed any challenges I see with this plan with the nurse or doctor.    ..........................................................................................................................................  Patient/Patient Representative Signature      ..........................................................................................................................................  Patient Representative Print Name and Relationship to Patient    ..................................................               ................................................  Date                                            Time    ..........................................................................................................................................  Reviewed by Signature/Title    ...................................................              ..............................................  Date                                                            Time          22EPIC Rev 08/18

## 2018-08-24 NOTE — DISCHARGE INSTRUCTIONS
Take the zofran  (1-2 tabletes ) every 8 hours as needed for nausea  Rest and drink plenty of fluids  Follow up if ongoing or persistent symptoms greater than 5 days  Possible Causes of Dizziness or Fainting    Dizziness and fainting can have many causes. Below are some examples of possible causes your healthcare provider will look to rule out.  Benign paroxysmal positional vertigo (BPPV)  BPPV results when calcium crystals inside the inner ear shift into the wrong position. BPPV causes episodes of vertigo (a spinning sensation). Episodes most often happen when the head is moved in a certain way. This is more common in people 65 and older.   Infection or inflammation  The semicircular canals of the ear may become infected or inflamed. In this case, they can send the wrong balance signals. This can cause vertigo.  Meniere disease  Meniere disease happens when there is too much fluid in the semicircular canals. This can cause vertigo. It also can cause hearing problems and buzzing or ringing in the ears (called tinnitus). You may also have a feeling of pressure or fullness in the ear.  Syncope  Syncope is fainting that happens when the brain doesn t get enough oxygen-rich blood. It can be caused by low heart rate or low blood pressure. This is called vasovagal syncope. It can also be caused by sitting or standing up too quickly. This is called orthostatic hypotension. Syncope may also be due to a heart valve problem, an abnormal heart rhythm, or other heart problems. Dizziness can also happen from stroke, hemorrhage in the brain, or other problems in the brain. Your healthcare provider may do certain tests to rule out these conditions.  Other causes  Other causes include:    Medicines. Certain medicines can cause dizziness and even fainting. In some cases, stopping a medicine too quickly can lead to withdrawal symptoms, including dizziness and fainting.    Anxiety. Being anxious can lead to breathing changes, such  as hyperventilation. These can lead to dizziness and fainting.  Additional causes for dizziness and fainting also exist. Talk to your healthcare provider for more information.     Date Last Reviewed: 10/6/2015    1748-9542 [employer]. [address] [place] [address], [place] [address]. All rights reserved. This information is not intended as a substitute for professional medical care. Always follow your healthcare professional's instructions.          Treating Diarrhea    Diarrhea happens when you have loose, watery, or frequent bowel movements. It is a common problem with many causes. Most cases of diarrhea clear up on their own. But certain cases may need treatment. Be sure to see your healthcare provider if your symptoms do not improve within a few days.  Getting relief  Treatment of diarrhea depends on its cause. Diarrhea caused by bacterial or parasite infection is often treated with antibiotics. Diarrhea caused by other factors, such as a stomach virus, often improves with simple home treatment. The tips below may also help relieve your symptoms.    Drink plenty of fluids. This helps prevent too much fluid loss (dehydration). Water, clear soups, and electrolyte solutions are good choices. Avoid alcohol, coffee, tea, and milk. These can irritate your intestines and make symptoms worse.    Suck on ice chips if drinking makes you queasy.    Return to your normal diet slowly. You may want to eat bland foods at first, such as rice and toast. Also, you may need to avoid certain foods for a while, such as dairy products. These can make symptoms worse. Ask your healthcare provider if there are any other foods you should avoid.    If you were prescribed antibiotics, take them as directed.    Do not take anti-diarrhea medicines without asking your healthcare provider first.  Call your healthcare provider   Call your healthcare provider if you have any of the following:     A fever of 100.4 F (38.0 C) or higher, or  as directed by your healthcare provider    Severe pain    Worsening diarrhea or diarrhea for more than 2 days    Bloody vomit or stool    Signs of dehydration (dizziness, dry mouth and tongue, rapid pulse, dark urine)  Date Last Reviewed: 7/1/2016 2000-2017 [employer]. [address] [place] [address]. All rights reserved. This information is not intended as a substitute for professional medical care. Always follow your healthcare professional's instructions.

## 2018-08-24 NOTE — ED AVS SNAPSHOT
[facility] Emergency Department    [address] [address]    Phone:  [phone]    Fax:  [phone]                                       Malka Ontiveros   MRN: [id number]    Department:  [facility] Emergency Department   Date of Visit:  8/24/2018           Patient Information     Date Of Birth          1970        Your diagnoses for this visit were:     Dizziness     Diarrhea, unspecified type        You were seen by Birgit Rocha APRN CNP.      Follow-up Information     Please follow up.    Why:  As needed, If symptoms worsen        Discharge Instructions         Take the zofran  (1-2 tabletes ) every 8 hours as needed for nausea  Rest and drink plenty of fluids  Follow up if ongoing or persistent symptoms greater than 5 days  Possible Causes of Dizziness or Fainting    Dizziness and fainting can have many causes. Below are some examples of possible causes your healthcare provider will look to rule out.  Benign paroxysmal positional vertigo (BPPV)  BPPV results when calcium crystals inside the inner ear shift into the wrong position. BPPV causes episodes of vertigo (a spinning sensation). Episodes most often happen when the head is moved in a certain way. This is more common in people 65 and older.   Infection or inflammation  The semicircular canals of the ear may become infected or inflamed. In this case, they can send the wrong balance signals. This can cause vertigo.  Meniere disease  Meniere disease happens when there is too much fluid in the semicircular canals. This can cause vertigo. It also can cause hearing problems and buzzing or ringing in the ears (called tinnitus). You may also have a feeling of pressure or fullness in the ear.  Syncope  Syncope is fainting that happens when the brain doesn t get enough oxygen-rich blood. It can be caused by low heart rate or low blood pressure. This is called vasovagal syncope. It can also be caused by sitting or  standing up too quickly. This is called orthostatic hypotension. Syncope may also be due to a heart valve problem, an abnormal heart rhythm, or other heart problems. Dizziness can also happen from stroke, hemorrhage in the brain, or other problems in the brain. Your healthcare provider may do certain tests to rule out these conditions.  Other causes  Other causes include:    Medicines. Certain medicines can cause dizziness and even fainting. In some cases, stopping a medicine too quickly can lead to withdrawal symptoms, including dizziness and fainting.    Anxiety. Being anxious can lead to breathing changes, such as hyperventilation. These can lead to dizziness and fainting.  Additional causes for dizziness and fainting also exist. Talk to your healthcare provider for more information.     Date Last Reviewed: 10/6/2015    9246-5685 [employer]. [address] [place] [address], [place] [address]. All rights reserved. This information is not intended as a substitute for professional medical care. Always follow your healthcare professional's instructions.          Treating Diarrhea    Diarrhea happens when you have loose, watery, or frequent bowel movements. It is a common problem with many causes. Most cases of diarrhea clear up on their own. But certain cases may need treatment. Be sure to see your healthcare provider if your symptoms do not improve within a few days.  Getting relief  Treatment of diarrhea depends on its cause. Diarrhea caused by bacterial or parasite infection is often treated with antibiotics. Diarrhea caused by other factors, such as a stomach virus, often improves with simple home treatment. The tips below may also help relieve your symptoms.    Drink plenty of fluids. This helps prevent too much fluid loss (dehydration). Water, clear soups, and electrolyte solutions are good choices. Avoid alcohol, coffee, tea, and milk. These can irritate your intestines and make symptoms worse.    Suck  on ice chips if drinking makes you queasy.    Return to your normal diet slowly. You may want to eat bland foods at first, such as rice and toast. Also, you may need to avoid certain foods for a while, such as dairy products. These can make symptoms worse. Ask your healthcare provider if there are any other foods you should avoid.    If you were prescribed antibiotics, take them as directed.    Do not take anti-diarrhea medicines without asking your healthcare provider first.  Call your healthcare provider   Call your healthcare provider if you have any of the following:     A fever of 100.4 F (38.0 C) or higher, or as directed by your healthcare provider    Severe pain    Worsening diarrhea or diarrhea for more than 2 days    Bloody vomit or stool    Signs of dehydration (dizziness, dry mouth and tongue, rapid pulse, dark urine)  Date Last Reviewed: 7/1/2016 2000-2017 [employer]. [address] [place] [address]. All rights reserved. This information is not intended as a substitute for professional medical care. Always follow your healthcare professional's instructions.          24 Hour Appointment Hotline       To make an appointment at any [facility], call [phone] ([phone]). If you don't have a family doctor or clinic, we will help you find one. [facility] are conveniently located to serve the needs of you and your family.             Review of your medicines      START taking        Dose / Directions Last dose taken    ondansetron 4 MG ODT tab   Commonly known as:  ZOFRAN ODT   Dose:  4-8 mg   Quantity:  10 tablet        Take 1-2 tablets (4-8 mg) by mouth every 8 hours as needed for nausea   Refills:  0          Our records show that you are taking the medicines listed below. If these are incorrect, please call your family doctor or clinic.        Dose / Directions Last dose taken    NO ACTIVE MEDICATIONS        Refills:  0                Prescriptions were  sent or printed at these locations (1 Prescription)                   [facility] [place] - [address]   [address] [place], [place] [address]    Telephone:  [phone]   Fax:  [phone]   Hours:                  E-Prescribed (1 of 1)         ondansetron (ZOFRAN ODT) 4 MG ODT tab                Procedures and tests performed during your visit     Basic metabolic panel    CBC with platelets differential    HCG qualitative urine (UPT)    UA with Microscopic    Urine Culture      Orders Needing Specimen Collection     None      Pending Results     Date and Time Order Name Status Description    8/24/2018 1128 Urine Culture In process             Pending Culture Results     Date and Time Order Name Status Description    8/24/2018 1128 Urine Culture In process             Pending Results Instructions     If you had any lab results that were not finalized at the time of your Discharge, you can call the ED Lab Result RN at [phone]. You will be contacted by this team for any positive Lab results or changes in treatment. The nurses are available 7 days a week from 10A to 6:30P.  You can leave a message 24 hours per day and they will return your call.        Test Results From Your Hospital Stay        8/24/2018 10:41 AM      Component Results     Component Value Ref Range & Units Status    WBC 8.4 4.0 - 11.0 10e9/L Final    RBC Count 4.61 3.8 - 5.2 10e12/L Final    Hemoglobin 14.0 11.7 - 15.7 g/dL Final    Hematocrit 41.5 35.0 - 47.0 % Final    MCV 90 78 - 100 fl Final    MCH 30.4 26.5 - 33.0 pg Final    MCHC 33.7 31.5 - 36.5 g/dL Final    RDW 12.5 10.0 - 15.0 % Final    Platelet Count 219 150 - 450 10e9/L Final    Diff Method Automated Method  Final    % Neutrophils 55.0 % Final    % Lymphocytes 33.4 % Final    % Monocytes 8.0 % Final    % Eosinophils 2.5 % Final    % Basophils 0.6 % Final    % Immature Granulocytes 0.5 % Final    Nucleated RBCs 0 0 /100 Final    Absolute Neutrophil 4.6  1.6 - 8.3 10e9/L Final    Absolute Lymphocytes 2.8 0.8 - 5.3 10e9/L Final    Absolute Monocytes 0.7 0.0 - 1.3 10e9/L Final    Absolute Eosinophils 0.2 0.0 - 0.7 10e9/L Final    Absolute Basophils 0.1 0.0 - 0.2 10e9/L Final    Abs Immature Granulocytes 0.0 0 - 0.4 10e9/L Final    Absolute Nucleated RBC 0.0  Final         8/24/2018 10:56 AM      Component Results     Component Value Ref Range & Units Status    Sodium 140 133 - 144 mmol/L Final    Potassium 3.5 3.4 - 5.3 mmol/L Final    Chloride 109 94 - 109 mmol/L Final    Carbon Dioxide 26 20 - 32 mmol/L Final    Anion Gap 5 3 - 14 mmol/L Final    Glucose 91 70 - 99 mg/dL Final    Urea Nitrogen 10 7 - 30 mg/dL Final    Creatinine 0.57 0.52 - 1.04 mg/dL Final    GFR Estimate >90 >60 mL/min/1.7m2 Final    Non  GFR Calc    GFR Estimate If Black >90 >60 mL/min/1.7m2 Final    African American GFR Calc    Calcium 8.9 8.5 - 10.1 mg/dL Final         8/24/2018 10:37 AM      Component Results     Component Value Ref Range & Units Status    Color Urine Straw  Final    Appearance Urine Clear  Final    Glucose Urine Negative NEG^Negative mg/dL Final    Bilirubin Urine Negative NEG^Negative Final    Ketones Urine Negative NEG^Negative mg/dL Final    Specific Gravity Urine 1.005 1.003 - 1.035 Final    Blood Urine Negative NEG^Negative Final    pH Urine 7.0 5.0 - 7.0 pH Final    Protein Albumin Urine Negative NEG^Negative mg/dL Final    Urobilinogen mg/dL 0.0 0.0 - 2.0 mg/dL Final    Nitrite Urine Negative NEG^Negative Final    Leukocyte Esterase Urine Negative NEG^Negative Final    Source Unspecified Urine  Final    WBC Urine <1 0 - 5 /HPF Final    RBC Urine <1 0 - 2 /HPF Final    Bacteria Urine Few (A) NEG^Negative /HPF Final    Squamous Epithelial /HPF Urine 2 (H) 0 - 1 /HPF Final         8/24/2018 10:37 AM      Component Results     Component Value Ref Range & Units Status    HCG Qual Urine Negative NEG^Negative Final    This test is for screening purposes.   "Results should be interpreted along with   the clinical picture.  Confirmation testing is available if warranted by   ordering RBS624, HCG Quantitative Pregnancy.           2018 11:29 AM                Thank you for choosing [facility]       Thank you for choosing [facility] for your care. Our goal is always to provide you with excellent care. Hearing back from our patients is one way we can continue to improve our services. Please take a few minutes to complete the written survey that you may receive in the mail after you visit with us. Thank you!        [facility] Information     [facility] lets you send messages to your doctor, view your test results, renew your prescriptions, schedule appointments and more. To sign up, go to [email or web address] . Click on \"Log in\" on the left side of the screen, which will take you to the Welcome page. Then click on \"Sign up Now\" on the right side of the page.     You will be asked to enter the access code listed below, as well as some personal information. Please follow the directions to create your username and password.     Your access code is: [email or web address]  Expires: 2018 11:36 AM     Your access code will  in 90 days. If you need help or a new code, please call your [facility] clinic or [phone].        Care EveryWhere ID     This is your Care EveryWhere ID. This could be used by other organizations to access your [facility] medical records  [id number]        Equal Access to Services     ESTUARDO MURPHY : Hadii guido Salazar, jessica augustin, qashahla cabezas, danny blackman . So [facility] [phone].    ATENCIÓN: Si habla español, tiene a mckeon disposición servicios gratuitos de asistencia lingüística. Anirudh al [phone].    We comply with applicable federal civil rights laws and [place] laws. We do not discriminate on the basis of race, color, national origin, age, disability, sex, sexual orientation, or gender " identity.            After Visit Summary       This is your record. Keep this with you and show to your community pharmacist(s) and doctor(s) at your next visit.

## 2018-08-24 NOTE — LETTER
August 24, 2018      To Whom It May Concern:      Malka Ontiveros was seen in our Emergency Department today, 08/24/18.  I expect her condition to improve over the next few days.  She may return to work/school when improved.    Sincerely,        LONNIE Madrid CNP

## 2018-08-24 NOTE — ED PROVIDER NOTES
History     Chief Complaint   Patient presents with     Dizziness     Pt states hx of vertigo - was treated with PT about 5 yrs ago - worried she may have brain CA as her mother did.      HPI  Malka Ontiveros is a 48 year old female who admits to past medical history of vertigo approximately 5 years and 3 years ago who presents to the emergency department with history of dizziness, nausea, tingling, diarrhea, and headache.  Patient reports onset of symptoms was this morning noticed at work.  Patient describes her symptoms as following: dizziness (feels wobbly), nausea and tingling at top of head, diarrhea (3 episodes starting last night) loose watery stools, headache (pain in forehead and eyes and achy - onset 30 minutes ago), neck pain (sore) just started one minute ago  Pt given zofran and this was helpful.    Problem List:    There are no active problems to display for this patient.       Past Medical History:    History reviewed. No pertinent past medical history.    Past Surgical History:    History reviewed. No pertinent surgical history.    Family History:    Family History   Problem Relation Age of Onset     Brain Cancer Mother      Lung Cancer Mother      Pancreatic Cancer Father      Lung Cancer Father        Social History:  Marital Status:   [2]  Social History   Substance Use Topics     Smoking status: Current Every Day Smoker     Packs/day: 0.50     Last attempt to quit: 4/26/2015     Smokeless tobacco: Never Used     Alcohol use Yes      Comment: 3 times per month        Medications:      NO ACTIVE MEDICATIONS   ondansetron (ZOFRAN ODT) 4 MG ODT tab         Review of Systems   Constitutional: Negative for fever.   HENT: Negative for ear discharge, ear pain and sore throat.    Eyes: Negative for pain, discharge and redness.   Respiratory: Negative for cough, shortness of breath and wheezing.    Cardiovascular: Negative for chest pain and leg swelling.   Gastrointestinal: Positive for diarrhea  and nausea. Negative for abdominal pain, blood in stool, constipation and vomiting.   Genitourinary: Negative for difficulty urinating, dysuria, genital sores and hematuria.   Musculoskeletal: Positive for neck pain. Negative for back pain and joint swelling.   Skin: Negative for rash and wound.   Neurological: Positive for dizziness and headaches. Negative for seizures and numbness.   Psychiatric/Behavioral: Negative for confusion and self-injury. The patient is not nervous/anxious.    All other systems reviewed and are negative.      Physical Exam   BP: (!) 174/94  Pulse: 82  Temp: 97.3  F (36.3  C)  Resp: 12  Height: 152.4 cm (5')  Weight: 63.5 kg (140 lb)  SpO2: 100 %      Physical Exam   Constitutional: She is oriented to person, place, and time. She appears well-developed and well-nourished. She appears distressed (ill appearing without distress).   HENT:   Head: Normocephalic and atraumatic.   Right Ear: Hearing, tympanic membrane, external ear and ear canal normal.   Left Ear: Hearing, tympanic membrane, external ear and ear canal normal.   Nose: Nose normal.   Mouth/Throat: Uvula is midline, oropharynx is clear and moist and mucous membranes are normal. No oropharyngeal exudate.   Eyes: Conjunctivae and EOM are normal. Pupils are equal, round, and reactive to light. Right eye exhibits no discharge. Left eye exhibits no discharge.   Neck: Neck supple. No tracheal deviation present. No thyromegaly present.   Cardiovascular: Normal rate, regular rhythm and normal heart sounds.  Exam reveals no gallop and no friction rub.    No murmur heard.  Pulmonary/Chest: Effort normal and breath sounds normal. No respiratory distress. She has no wheezes. She has no rales. She exhibits no tenderness.   Abdominal: Soft. Bowel sounds are normal. She exhibits no distension and no mass. There is no tenderness. There is no rebound and no guarding.   Neurological: She is alert and oriented to person, place, and time. She has  normal strength. No cranial nerve deficit or sensory deficit. Coordination normal. GCS eye subscore is 4. GCS verbal subscore is 5. GCS motor subscore is 6.   Clock test normal  Mini mental status exam normal  Epley maneuver negative for nystagmus and reproducible nausea, dizziness, or emesis     Skin: Skin is warm and dry. No rash noted. She is not diaphoretic. No erythema. No pallor.   Psychiatric: She has a normal mood and affect.   Nursing note and vitals reviewed.      ED Course     ED Course     Procedures        Results for orders placed or performed during the hospital encounter of 08/24/18 (from the past 24 hour(s))   UA with Microscopic   Result Value Ref Range    Color Urine Straw     Appearance Urine Clear     Glucose Urine Negative NEG^Negative mg/dL    Bilirubin Urine Negative NEG^Negative    Ketones Urine Negative NEG^Negative mg/dL    Specific Gravity Urine 1.005 1.003 - 1.035    Blood Urine Negative NEG^Negative    pH Urine 7.0 5.0 - 7.0 pH    Protein Albumin Urine Negative NEG^Negative mg/dL    Urobilinogen mg/dL 0.0 0.0 - 2.0 mg/dL    Nitrite Urine Negative NEG^Negative    Leukocyte Esterase Urine Negative NEG^Negative    Source Unspecified Urine     WBC Urine <1 0 - 5 /HPF    RBC Urine <1 0 - 2 /HPF    Bacteria Urine Few (A) NEG^Negative /HPF    Squamous Epithelial /HPF Urine 2 (H) 0 - 1 /HPF   HCG qualitative urine (UPT)   Result Value Ref Range    HCG Qual Urine Negative NEG^Negative   CBC with platelets differential   Result Value Ref Range    WBC 8.4 4.0 - 11.0 10e9/L    RBC Count 4.61 3.8 - 5.2 10e12/L    Hemoglobin 14.0 11.7 - 15.7 g/dL    Hematocrit 41.5 35.0 - 47.0 %    MCV 90 78 - 100 fl    MCH 30.4 26.5 - 33.0 pg    MCHC 33.7 31.5 - 36.5 g/dL    RDW 12.5 10.0 - 15.0 %    Platelet Count 219 150 - 450 10e9/L    Diff Method Automated Method     % Neutrophils 55.0 %    % Lymphocytes 33.4 %    % Monocytes 8.0 %    % Eosinophils 2.5 %    % Basophils 0.6 %    % Immature Granulocytes 0.5 %     Nucleated RBCs 0 0 /100    Absolute Neutrophil 4.6 1.6 - 8.3 10e9/L    Absolute Lymphocytes 2.8 0.8 - 5.3 10e9/L    Absolute Monocytes 0.7 0.0 - 1.3 10e9/L    Absolute Eosinophils 0.2 0.0 - 0.7 10e9/L    Absolute Basophils 0.1 0.0 - 0.2 10e9/L    Abs Immature Granulocytes 0.0 0 - 0.4 10e9/L    Absolute Nucleated RBC 0.0    Basic metabolic panel   Result Value Ref Range    Sodium 140 133 - 144 mmol/L    Potassium 3.5 3.4 - 5.3 mmol/L    Chloride 109 94 - 109 mmol/L    Carbon Dioxide 26 20 - 32 mmol/L    Anion Gap 5 3 - 14 mmol/L    Glucose 91 70 - 99 mg/dL    Urea Nitrogen 10 7 - 30 mg/dL    Creatinine 0.57 0.52 - 1.04 mg/dL    GFR Estimate >90 >60 mL/min/1.7m2    GFR Estimate If Black >90 >60 mL/min/1.7m2    Calcium 8.9 8.5 - 10.1 mg/dL       Medications   ondansetron (ZOFRAN-ODT) ODT tab 4 mg (4 mg Oral Given 8/24/18 0921)       Assessments & Plan (with Medical Decision Making)     I have reviewed the nursing notes.    I have reviewed the findings, diagnosis, plan and need for follow up with the patient.  Malka Ontiveros is a 48 year old female who admits to past medical history of vertigo approximately 5 years and 3 years ago who presents to the emergency department with history of dizziness, nausea, tingling, diarrhea, and headache.  Patient reports onset of symptoms was this morning noticed at work.  Patient describes her symptoms as following: dizziness (feels wobbly), nausea and tingling at top of head, diarrhea (3 episodes starting last night) loose watery stools, headache (pain in forehead and eyes and achy - onset 30 minutes ago), neck pain (sore) just started one minute ago.    Exam as noted above.  Epley maneuver negative for benign positional vertigo.  Basic metabolic completed and sodium is normal and therefore unlikely to cause of symptoms.  CBC completed and within normal limits and unlikely to be severe systemic infectious etiology.  Patient denies any knowledge of any recent foods that were  suspicious for infection denies eating recently any solids at [facility].  Will treat symptoms as viral etiology and explained this to the patient.  Recommend Tylenol or ibuprofen as needed for the headache and Zofran for the nausea and gave handout on treatment for diarrhea.  Recommend follow-up if worsening of symptoms or persistent symptoms longer than 5-7 days.  Patient verbalizes understanding was discharged in stable condition.  Work notice given for the next few days to be off work.  Discharge Medication List as of 8/24/2018 11:36 AM      START taking these medications    Details   ondansetron (ZOFRAN ODT) 4 MG ODT tab Take 1-2 tablets (4-8 mg) by mouth every 8 hours as needed for nausea, Disp-10 tablet, R-0, E-Prescribe             Final diagnoses:   Dizziness   Diarrhea, unspecified type       8/24/2018   [facility] EMERGENCY DEPARTMENT     Birgit Rocha, LONNIE ORTIZ  08/24/18 2233

## 2018-08-24 NOTE — ED NOTES
"Pt is sitting up on her cell phone, states \" Im feeling some better, no nausea and dizziness is less\" requested H20 this was done.   "

## 2018-08-25 LAB
BACTERIA SPEC CULT: NORMAL
Lab: NORMAL
SPECIMEN SOURCE: NORMAL

## 2018-08-28 ENCOUNTER — OFFICE VISIT (OUTPATIENT)
Dept: FAMILY MEDICINE | Facility: CLINIC | Age: 48
End: 2018-08-28
Payer: COMMERCIAL

## 2018-08-28 VITALS
RESPIRATION RATE: 14 BRPM | HEART RATE: 68 BPM | SYSTOLIC BLOOD PRESSURE: 140 MMHG | WEIGHT: 140 LBS | TEMPERATURE: 98.2 F | DIASTOLIC BLOOD PRESSURE: 76 MMHG | HEIGHT: 60 IN | BODY MASS INDEX: 27.48 KG/M2

## 2018-08-28 DIAGNOSIS — H81.10 BENIGN PAROXYSMAL POSITIONAL VERTIGO, UNSPECIFIED LATERALITY: Primary | ICD-10-CM

## 2018-08-28 PROCEDURE — 99214 OFFICE O/P EST MOD 30 MIN: CPT | Performed by: FAMILY MEDICINE

## 2018-08-28 RX ORDER — MECLIZINE HYDROCHLORIDE 25 MG/1
25 TABLET ORAL 3 TIMES DAILY PRN
Qty: 30 TABLET | Refills: 1 | Status: SHIPPED | OUTPATIENT
Start: 2018-08-28

## 2018-08-28 NOTE — PATIENT INSTRUCTIONS
Schedule physical therapy consult and treatment by calling the number in the referral below.    Do not drive until the vertigo completely resolve.    Meclizine 25 mg orally 8 hours apart as needed for vertigo.    Benign Paroxysmal Positional Vertigo    Benign paroxysmal positional vertigo is a common condition. You feel as if the room is spinning after changing position, moving your head quickly, or even just rolling over in bed.  Vertigo is a false feeling of motion plus disorientation that makes it seem as though the room is spinning. A vertigo attack may cause sudden nausea, vomiting, and heavy sweating. Severe vertigo causes a loss of balance. You may even fall down.  Vertigo is caused by a problem with the inner ear. The inner ear is located behind the middle ear. It is a part of the balance center of the body. It contains small calcium particles within fluid-filled canals (semi-circular canals). These particles can move out of position. This may happen as a result of aging, head injury, or disease of the inner ear. Once that happens, moving your head in certain ways may cause the particles to stimulate the inner ear. This creates the feeling of vertigo.  An episode of vertigo may last seconds, minutes, or hours. Once you are over the first episode of vertigo, it may never return. Sometimes symptoms return off and on for several weeks or longer.  Home care  Follow these guidelines when caring for yourself at home:    Rest quietly in bed if your symptoms are severe. Change position slowly. There is usually 1 position that will feel best. This might be lying on 1 side or lying on your back with your head slightly raised on pillows. Until you have no symptoms, you are at a higher risk of falling. Let someone help you when you get up. Get rid of home hazards such as loose electrical cords and throw rugs. Don t walk in unfamiliar areas that are not lighted. Use night lights in bathrooms and kitchen areas.    Do not  drive or work with dangerous machinery for 1 week after symptoms go away. This is in case symptoms return suddenly.    Take medicine as prescribed to relieve your symptoms. Unless another medicine was prescribed for nausea, vomiting, and vertigo, you may use over-the-counter motion sickness medicine. Examples of this include meclizine and dimenhydrinate.  Follow-up care  Follow up with your healthcare provider, or as directed. Tell your provider about any ringing in your ear or hearing loss.  If you had a CT or MRI scan, a specialist will review it. You will be told of any new findings that may affect your care.  When to seek medical advice  Call your healthcare provider right away if any of these occur:    Vertigo gets worse even after taking prescribed medicine    Repeated vomiting even after taking prescribed medicine    Weakness that gets worse    Fainting    Severe headache or unusual drowsiness or confusion    Weakness of an arm or leg or 1 side of the face    Trouble walking    Trouble with speech or vision    Seizure    Trouble hearing    Fever of 100.4 F (38 C) or higher, or as directed by your healthcare provider    Fast heart rate    Chest pain   Date Last Reviewed: 11/1/2017 2000-2017 [employer]. [address] [place] [address]. All rights reserved. This information is not intended as a substitute for professional medical care. Always follow your healthcare professional's instructions.

## 2018-08-28 NOTE — MR AVS SNAPSHOT
After Visit Summary   8/28/2018    Malka Ontiveros    MRN: [id number]           Patient Information     Date Of Birth          1970        Visit Information        Provider Department      8/28/2018 3:20 PM Shelton Floyd MD [facility]        Today's Diagnoses     Benign paroxysmal positional vertigo, unspecified laterality    -  1      Care Instructions    Schedule physical therapy consult and treatment by calling the number in the referral below.    Do not drive until the vertigo completely resolve.    Meclizine 25 mg orally 8 hours apart as needed for vertigo.    Benign Paroxysmal Positional Vertigo    Benign paroxysmal positional vertigo is a common condition. You feel as if the room is spinning after changing position, moving your head quickly, or even just rolling over in bed.  Vertigo is a false feeling of motion plus disorientation that makes it seem as though the room is spinning. A vertigo attack may cause sudden nausea, vomiting, and heavy sweating. Severe vertigo causes a loss of balance. You may even fall down.  Vertigo is caused by a problem with the inner ear. The inner ear is located behind the middle ear. It is a part of the balance center of the body. It contains small calcium particles within fluid-filled canals (semi-circular canals). These particles can move out of position. This may happen as a result of aging, head injury, or disease of the inner ear. Once that happens, moving your head in certain ways may cause the particles to stimulate the inner ear. This creates the feeling of vertigo.  An episode of vertigo may last seconds, minutes, or hours. Once you are over the first episode of vertigo, it may never return. Sometimes symptoms return off and on for several weeks or longer.  Home care  Follow these guidelines when caring for yourself at home:    Rest quietly in bed if your symptoms are severe. Change position slowly. There is usually 1  position that will feel best. This might be lying on 1 side or lying on your back with your head slightly raised on pillows. Until you have no symptoms, you are at a higher risk of falling. Let someone help you when you get up. Get rid of home hazards such as loose electrical cords and throw rugs. Don t walk in unfamiliar areas that are not lighted. Use night lights in bathrooms and kitchen areas.    Do not drive or work with dangerous machinery for 1 week after symptoms go away. This is in case symptoms return suddenly.    Take medicine as prescribed to relieve your symptoms. Unless another medicine was prescribed for nausea, vomiting, and vertigo, you may use over-the-counter motion sickness medicine. Examples of this include meclizine and dimenhydrinate.  Follow-up care  Follow up with your healthcare provider, or as directed. Tell your provider about any ringing in your ear or hearing loss.  If you had a CT or MRI scan, a specialist will review it. You will be told of any new findings that may affect your care.  When to seek medical advice  Call your healthcare provider right away if any of these occur:    Vertigo gets worse even after taking prescribed medicine    Repeated vomiting even after taking prescribed medicine    Weakness that gets worse    Fainting    Severe headache or unusual drowsiness or confusion    Weakness of an arm or leg or 1 side of the face    Trouble walking    Trouble with speech or vision    Seizure    Trouble hearing    Fever of 100.4 F (38 C) or higher, or as directed by your healthcare provider    Fast heart rate    Chest pain   Date Last Reviewed: 11/1/2017 2000-2017 [employer]. [address] [place] [address], [place] [address]. All rights reserved. This information is not intended as a substitute for professional medical care. Always follow your healthcare professional's instructions.                Follow-ups after your visit        Additional Services     PHYSICAL  "THERAPY REFERRAL       *This therapy referral will be filtered to a centralized scheduling office at [facility] and the patient will receive a call to schedule an appointment at a [place] location most convenient for them. *     [facility] provides Physical Therapy evaluation and treatment and many specialty services across the [place] system.  If requesting a specialty program, please choose from the list below.    If you have not heard from the scheduling office within 2 business days, please call [phone] for all locations, with the exception of [place], please call [phone] and [facility], please call [phone]  Treatment: Evaluation & Treatment  Special Instructions/Modalities: per therapist recommendation  Special Programs: Balance/Vestibular    Please be aware that coverage of these services is subject to the terms and limitations of your health insurance plan.  Call member services at your health plan with any benefit or coverage questions.      **Note to Provider:  If you are referring outside of [place] for the therapy appointment, please list the name of the location in the \"special instructions\" above, print the referral and give to the patient to schedule the appointment.                  Who to contact     If you have questions or need follow up information about today's clinic visit or your schedule please contact [facility] directly at [phone].  Normal or non-critical lab and imaging results will be communicated to you by MyChart, letter or phone within 4 business days after the clinic has received the results. If you do not hear from us within 7 days, please contact the clinic through MyChart or phone. If you have a critical or abnormal lab result, we will notify you by phone as soon as possible.  Submit refill requests through [employer] or call your pharmacy and they will forward the refill request to us. Please allow 3 " "business days for your refill to be completed.          Additional Information About Your Visit        MyChart Information     [email or web address] lets you send messages to your doctor, view your test results, renew your prescriptions, schedule appointments and more. To sign up, go to [email or web address] . Click on \"Log in\" on the left side of the screen, which will take you to the Welcome page. Then click on \"Sign up Now\" on the right side of the page.     You will be asked to enter the access code listed below, as well as some personal information. Please follow the directions to create your username and password.     Your access code is: [email or web address]  Expires: 2018 11:36 AM     Your access code will  in 90 days. If you need help or a new code, please call your [place] clinic or [phone].        Care EveryWhere ID     This is your Care EveryWhere ID. This could be used by other organizations to access your [facility] medical records  [id number]        Your Vitals Were     Pulse Temperature Respirations Height BMI (Body Mass Index)       68 98.2  F (36.8  C) (Tympanic) 14 5' (1.524 m) 27.34 kg/m2        Blood Pressure from Last 3 Encounters:   18 140/76   18 (!) 174/94   17 135/63    Weight from Last 3 Encounters:   18 140 lb (63.5 kg)   18 140 lb (63.5 kg)   17 181 lb 9.6 oz (82.4 kg)              We Performed the Following     PHYSICAL THERAPY REFERRAL          Today's Medication Changes          These changes are accurate as of 18  4:03 PM.  If you have any questions, ask your nurse or doctor.               Start taking these medicines.        Dose/Directions    meclizine 25 MG tablet   Commonly known as:  ANTIVERT   Used for:  Benign paroxysmal positional vertigo, unspecified laterality   Started by:  Shelton Floyd MD        Dose:  25 mg   Take 1 tablet (25 mg) by mouth 3 times daily as needed for dizziness   Quantity:  30 tablet   Refills:  1       "      Where to get your medicines      These medications [place] [facility] [place] [address]  [address] [address]     Phone:  [phone]     meclizine 25 MG tablet                Primary Care Provider Fax #    Physician No Ref-Primary [phone]       No address on file        Equal Access to Services     ESTUADRO MURPHY : Hadii aad ku hadasho Soomaali, waaxda luqadaha, qaybta kaalmada adeegyada, waxay héctorin hayaan adedre kharash lacarter . So [facility] [phone].    ATENCIÓN: Si habla español, tiene a mckeon disposición servicios gratuitos de asistencia lingüística. Anirudh al [phone].    We comply with applicable federal civil rights laws and [place] laws. We do not discriminate on the basis of race, color, national origin, age, disability, sex, sexual orientation, or gender identity.            Thank you!     Thank you for choosing [facility]  for your care. Our goal is always to provide you with excellent care. Hearing back from our patients is one way we can continue to improve our services. Please take a few minutes to complete the written survey that you may receive in the mail after your visit with us. Thank you!             Your Updated Medication List - Protect others around you: Learn how to safely use, store and throw away your medicines at www.disposemymeds.org.          This list is accurate as of 8/28/18  4:03 PM.  Always use your most recent med list.                   Brand Name Dispense Instructions for use Diagnosis    meclizine 25 MG tablet    ANTIVERT    30 tablet    Take 1 tablet (25 mg) by mouth 3 times daily as needed for dizziness    Benign paroxysmal positional vertigo, unspecified laterality       NO ACTIVE MEDICATIONS

## 2018-08-28 NOTE — PROGRESS NOTES
SUBJECTIVE:   Malka Ontiveros is a 48 year old female who presents to clinic today for the following health issues:  Chief Complaint   Patient presents with     ER F/U     Pt here for post e/r f/u.       ED/UC Followup:    Facility:  [facility]  Date of visit: 8/24/18  Reason for visit: dizziness  Current Status: nausea is better, dizziness a little worse today       Dizziness  Onset: 5 days ago    Description:   Do you feel faint:  no   Does it feel like the surroundings (bed, room) are moving: YES  Unsteady/off balance: YES  Have you passed out or fallen: no     Intensity: moderate    Progression of Symptoms:  Worsening today and waxing and waning    Accompanying Signs & Symptoms:  Heart palpitations: no   Nausea, vomiting: YES- some nausea but that is a little better  Weakness in arms or legs: no   Fatigue: YES  Vision or speech changes: blurriness, harder to focus   Ringing in ears (Tinnitus): no   Hearing Loss: no   Pt also having headaches along with it behind eyes and by temples.    History:   Head trauma/concussion hx: no   Previous similar symptoms: YES- vertigo 2011 & 2012, relieved by referral to vertigo clinic and prn meclizine  Recent bleeding history: no     Precipitating factors:   Worse with activity or head movement: YES  Any new medications (BP?): no   Alcohol/drug abuse/withdrawal: no     Alleviating factors:   Does staying in a fixed position give relief:  YES    Therapies Tried and outcome: zofran for nausea        Verified above history with patient.      Problem list and histories reviewed & adjusted, as indicated.  Additional history: as documented    There is no problem list on file for this patient.    History reviewed. No pertinent surgical history.    Social History   Substance Use Topics     Smoking status: Current Every Day Smoker     Packs/day: 0.50     Years: 30.00     Smokeless tobacco: Never Used     Alcohol use Yes      Comment: 3 times per month     Family History   Problem Relation  Age of Onset     Brain Cancer Mother      Lung Cancer Mother      Pancreatic Cancer Father      Lung Cancer Father          Current Outpatient Prescriptions   Medication Sig Dispense Refill     meclizine (ANTIVERT) 25 MG tablet Take 1 tablet (25 mg) by mouth 3 times daily as needed for dizziness 30 tablet 1     NO ACTIVE MEDICATIONS        No Known Allergies    Reviewed and updated as needed this visit by clinical staff  Tobacco  Allergies  Meds  Problems  Med Hx  Surg Hx  Fam Hx  Soc Hx        Reviewed and updated as needed this visit by Provider  Allergies  Meds  Problems         ROS:  C: NEGATIVE for fever, chills, change in weight  I: NEGATIVE for worrisome rashes, moles or lesions  E: NEGATIVE for vision changes or irritation  R: NEGATIVE for significant cough or SOB  CV: NEGATIVE for chest pain, palpitations or peripheral edema  GI: NEGATIVE for nausea, abdominal pain, heartburn, or change in bowel habits  : NEGATIVE for frequency, dysuria, or hematuria  M: NEGATIVE for significant arthralgias or myalgia  N: NEGATIVE for weakness, dizziness or paresthesias  E: NEGATIVE for temperature intolerance, skin/hair changes  H: NEGATIVE for bleeding problems    OBJECTIVE:                                                    /76  Pulse 68  Temp 98.2  F (36.8  C) (Tympanic)  Resp 14  Ht 5' (1.524 m)  Wt 140 lb (63.5 kg)  BMI 27.34 kg/m2  Body mass index is 27.34 kg/(m^2).  GENERAL: slightly overweight, alert and no distress, ambulatory without assist; patient reported vertigo upon getting up from chair, gait slightly guarded  EYES: pink conj, no icterus, EOMI, PERRLA, very slight vertigo on right lateral gaze  HENT: EAM clear bilaterally. TMs intact and non-erythematous bilat with mild effusion; nose clear; throat clear  NECK: no tenderness, no adenopathy,  Thyroid not enlarged  RESP: lungs clear to auscultation - no rales, no rhonchi, no wheezes  CV: regular rates and rhythm, no murmur  MS: no  edema  SKIN: no suspicious lesions, no rashes  NEURO: Patient is A and O X 3; Cranial nerves 2-12 intact;  Strength 5/5 all extremities; DTR: ++ x 4; Romberg negative; tandem walk difficult due to dizziness; Sensory no deficit ; no tremors; No problems with motor coordination  [facility] deferred      Diagnostic test results:  Diagnostic Test Results:  Results for orders placed or performed during the hospital encounter of 08/24/18   CBC with platelets differential   Result Value Ref Range    WBC 8.4 4.0 - 11.0 10e9/L    RBC Count 4.61 3.8 - 5.2 10e12/L    Hemoglobin 14.0 11.7 - 15.7 g/dL    Hematocrit 41.5 35.0 - 47.0 %    MCV 90 78 - 100 fl    MCH 30.4 26.5 - 33.0 pg    MCHC 33.7 31.5 - 36.5 g/dL    RDW 12.5 10.0 - 15.0 %    Platelet Count 219 150 - 450 10e9/L    Diff Method Automated Method     % Neutrophils 55.0 %    % Lymphocytes 33.4 %    % Monocytes 8.0 %    % Eosinophils 2.5 %    % Basophils 0.6 %    % Immature Granulocytes 0.5 %    Nucleated RBCs 0 0 /100    Absolute Neutrophil 4.6 1.6 - 8.3 10e9/L    Absolute Lymphocytes 2.8 0.8 - 5.3 10e9/L    Absolute Monocytes 0.7 0.0 - 1.3 10e9/L    Absolute Eosinophils 0.2 0.0 - 0.7 10e9/L    Absolute Basophils 0.1 0.0 - 0.2 10e9/L    Abs Immature Granulocytes 0.0 0 - 0.4 10e9/L    Absolute Nucleated RBC 0.0    Basic metabolic panel   Result Value Ref Range    Sodium 140 133 - 144 mmol/L    Potassium 3.5 3.4 - 5.3 mmol/L    Chloride 109 94 - 109 mmol/L    Carbon Dioxide 26 20 - 32 mmol/L    Anion Gap 5 3 - 14 mmol/L    Glucose 91 70 - 99 mg/dL    Urea Nitrogen 10 7 - 30 mg/dL    Creatinine 0.57 0.52 - 1.04 mg/dL    GFR Estimate >90 >60 mL/min/1.7m2    GFR Estimate If Black >90 >60 mL/min/1.7m2    Calcium 8.9 8.5 - 10.1 mg/dL   UA with Microscopic   Result Value Ref Range    Color Urine Straw     Appearance Urine Clear     Glucose Urine Negative NEG^Negative mg/dL    Bilirubin Urine Negative NEG^Negative    Ketones Urine Negative NEG^Negative mg/dL    Specific Gravity Urine  1.005 1.003 - 1.035    Blood Urine Negative NEG^Negative    pH Urine 7.0 5.0 - 7.0 pH    Protein Albumin Urine Negative NEG^Negative mg/dL    Urobilinogen mg/dL 0.0 0.0 - 2.0 mg/dL    Nitrite Urine Negative NEG^Negative    Leukocyte Esterase Urine Negative NEG^Negative    Source Unspecified Urine     WBC Urine <1 0 - 5 /HPF    RBC Urine <1 0 - 2 /HPF    Bacteria Urine Few (A) NEG^Negative /HPF    Squamous Epithelial /HPF Urine 2 (H) 0 - 1 /HPF   HCG qualitative urine (UPT)   Result Value Ref Range    HCG Qual Urine Negative NEG^Negative   Urine Culture   Result Value Ref Range    Specimen Description Unspecified Urine     Special Requests Specimen received in preservative     Culture Micro       50,000 to 100,000 colonies/mL  mixed urogenital kt          ASSESSMENT/PLAN:                                                        ICD-10-CM    1. Benign paroxysmal positional vertigo, unspecified laterality H81.10 meclizine (ANTIVERT) 25 MG tablet     PHYSICAL THERAPY REFERRAL     Benign CV and neuro exam today except for inducible vertigo  Discussed cause, course and treatment of condition.  Fall precautions. No driving until resolved.  Off work until resolved.  Meclizine use discussed in detail.  ADR to Meclizine discussed; advised precautions in taking the med.  Advised to see provider if with worsening sx, new symptoms, or inability to ambulate independently.    Follow up with Provider - 9/10 for reeval.   Patient Instructions   Schedule physical therapy consult and treatment by calling the number in the referral below.    Do not drive until the vertigo completely resolve.    Meclizine 25 mg orally 8 hours apart as needed for vertigo.    Benign Paroxysmal Positional Vertigo    Benign paroxysmal positional vertigo is a common condition. You feel as if the room is spinning after changing position, moving your head quickly, or even just rolling over in bed.  Vertigo is a false feeling of motion plus disorientation that  makes it seem as though the room is spinning. A vertigo attack may cause sudden nausea, vomiting, and heavy sweating. Severe vertigo causes a loss of balance. You may even fall down.  Vertigo is caused by a problem with the inner ear. The inner ear is located behind the middle ear. It is a part of the balance center of the body. It contains small calcium particles within fluid-filled canals (semi-circular canals). These particles can move out of position. This may happen as a result of aging, head injury, or disease of the inner ear. Once that happens, moving your head in certain ways may cause the particles to stimulate the inner ear. This creates the feeling of vertigo.  An episode of vertigo may last seconds, minutes, or hours. Once you are over the first episode of vertigo, it may never return. Sometimes symptoms return off and on for several weeks or longer.  Home care  Follow these guidelines when caring for yourself at home:    Rest quietly in bed if your symptoms are severe. Change position slowly. There is usually 1 position that will feel best. This might be lying on 1 side or lying on your back with your head slightly raised on pillows. Until you have no symptoms, you are at a higher risk of falling. Let someone help you when you get up. Get rid of home hazards such as loose electrical cords and throw rugs. Don t walk in unfamiliar areas that are not lighted. Use night lights in bathrooms and kitchen areas.    Do not drive or work with dangerous machinery for 1 week after symptoms go away. This is in case symptoms return suddenly.    Take medicine as prescribed to relieve your symptoms. Unless another medicine was prescribed for nausea, vomiting, and vertigo, you may use over-the-counter motion sickness medicine. Examples of this include meclizine and dimenhydrinate.  Follow-up care  Follow up with your healthcare provider, or as directed. Tell your provider about any ringing in your ear or hearing  loss.  If you had a CT or MRI scan, a specialist will review it. You will be told of any new findings that may affect your care.  When to seek medical advice  Call your healthcare provider right away if any of these occur:    Vertigo gets worse even after taking prescribed medicine    Repeated vomiting even after taking prescribed medicine    Weakness that gets worse    Fainting    Severe headache or unusual drowsiness or confusion    Weakness of an arm or leg or 1 side of the face    Trouble walking    Trouble with speech or vision    Seizure    Trouble hearing    Fever of 100.4 F (38 C) or higher, or as directed by your healthcare provider    Fast heart rate    Chest pain   Date Last Reviewed: 11/1/2017 2000-2017 [employer]. [address] [place] [address], [place] [address]. All rights reserved. This information is not intended as a substitute for professional medical care. Always follow your healthcare professional's instructions.            Shelton Floyd MD  [facility]

## 2018-08-28 NOTE — LETTER
[facility]  [address] [address]  [phone]          August 28, 2018    RE:  Malka Ontiveros                                                                                                                                                       [address] [place] [address]            To whom it may concern:    Malka Ontiveros is under my professional care for Benign paroxysmal positional vertigo, unspecified laterality     Due to persistent dizziness, please excuse her from work through September 10, 2018.    Thank you for your consideration.      Sincerely,        Shelton Floyd MD

## 2018-08-29 ENCOUNTER — HOSPITAL ENCOUNTER (OUTPATIENT)
Dept: PHYSICAL THERAPY | Facility: CLINIC | Age: 48
Setting detail: THERAPIES SERIES
End: 2018-08-29
Attending: FAMILY MEDICINE
Payer: COMMERCIAL

## 2018-08-29 PROCEDURE — 95992 CANALITH REPOSITIONING PROC: CPT | Mod: GP | Performed by: PHYSICAL THERAPIST

## 2018-08-29 PROCEDURE — 40000840 ZZHC STATISTIC PT VESTIBULAR VISIT: Performed by: PHYSICAL THERAPIST

## 2018-08-29 PROCEDURE — 97112 NEUROMUSCULAR REEDUCATION: CPT | Mod: GP,59 | Performed by: PHYSICAL THERAPIST

## 2018-08-29 PROCEDURE — 97161 PT EVAL LOW COMPLEX 20 MIN: CPT | Mod: GP | Performed by: PHYSICAL THERAPIST

## 2018-08-29 NOTE — PROGRESS NOTES
Malka Ontiveros  1970 Physical Therapy Initial Evaluation  08/29/18 1400   Quick Adds   Quick Adds Vestibular Eval   Type of Visit Initial OP PT Evaluation   General Information   Start of Care Date 08/29/18   Referring Physician Shelton Floyd   Orders Evaluate and Treat as Indicated   Order Date 08/28/18   Medical Diagnosis Benign paroxysmal positional vertigo, unspecified laterality   Onset of illness/injury or Date of Surgery 08/24/18   Precautions/Limitations no known precautions/limitations   Surgical/Medical history reviewed Yes   Pertinent history of current problem (include personal factors and/or comorbidities that impact the POC) Dizziness will last as long as she is moving around, walking. Has had dizziness in the past and was treated in [place]. Having trouble focusing and having trouble with balance. Just feeling off. Started on August 24th. Was getting out of bed and felt a little off. Had trouble walking straight. Getting up and moving around will make it worse. If rolls over in bed will have a lot of dizziness. Has had some nausea. Took meclizine yesterday about 4 PM. Meclizine didn't help. Zofran has helped for the nausea. / Comorbidities - No comorbidities in EMR likely to impact PT   Patient role/Employment history Employed  (Manufacturing)   Fall Risk Screen   Fall screen completed by PT   Have you fallen 2 or more times in the past year? No   Have you fallen and had an injury in the past year? No   Is patient a fall risk? No   System Outcome Measures   Dizziness Handicap Inventory (score out of 100) A decrease in score by 17.18 or greater indicates a clinically significant change in symptoms. 58   Balance Special Tests   Balance Special Tests Modified CTSIB Conditions   Balance Special Tests Modified CTSIB Conditions   Condition 1, seconds 30 Seconds   Condition 2, seconds 30 Seconds   Condition 4, seconds 30 Seconds   Condition 5, seconds 30 Seconds   Cervicogenic Screen   Vertebral Artery  Test Normal   Alar Ligament Test Normal   Transverse Ligament Test Normal   Neck Torsion Test (head still, body rotating) Negative   Neck Torsion Test (head and body rotating) Abnormal  (Mild increased dizziness)   Oculomotor Exam   Smooth Pursuit Normal   Saccades Normal   VOR Normal   VOR Cancellation Abnormal   VOR Cancellation Comments Corrective saccades when moving to right   Rapid Head Thrust Normal   Convergence Testing Normal   Infrared Goggle Exam or Frenzel Lense Exam   Vestibular Suppressant in Last 24 Hours? Yes   Exam completed with Room Light   Spontaneous Nystagmus Negative   Gaze Evoked Nystagmus Negative   Positional testing Other   Luz-Hallpike (right) Other  (No nystagmus / Dizziness reported)   Lyons-Hallpike (Left) Negative   HSCC Supine Roll Test (Right) Negative   HSCC Supine Roll Test (Left) Negative   Dynamic Visual Acuity (DVA)   Static Acuity (LogMar) Line 11   Horizontal Head Movement at 2 Hz (LogMar) Line 10   Planned Therapy Interventions   Planned Therapy Interventions joint mobilization;balance training;gait training;neuromuscular re-education;ROM;strengthening;stretching   Clinical Impression   Criteria for Skilled Therapeutic Interventions Met yes, treatment indicated   PT Diagnosis Vestibular hypofunction on right   Influenced by the following impairments Dizziness   Functional limitations due to impairments Difficulty working, driving   Clinical Presentation Stable/Uncomplicated   Clinical Presentation Rationale No comorbidities impacting PT / 1 body system / Stable   Clinical Decision Making (Complexity) Low complexity   Therapy Frequency 2 times/Week   Predicted Duration of Therapy Intervention (days/wks) 8 weeks   Risk & Benefits of therapy have been explained Yes   Patient, Family & other staff in agreement with plan of care Yes   Education Assessment   Preferred Learning Style Listening;Reading;Demonstration;Pictures/video   Barriers to Learning No barriers   GOALS   PT Eval  Goals 1;2;3;4   Goal 1   Goal Identifier HEP   Goal Description Pt will be independent in HEP in order to achieve and maintain long term treatment goals.   Target Date 09/29/18   Goal 2   Goal Identifier Work   Goal Description Pt will have decreased dizziness and be able to return to work.   Target Date 10/24/18   Goal 3   Goal Identifier Driving   Goal Description Pt will be able to drive safely.   Target Date 10/24/18   Total Evaluation Time   Total Evaluation Time (Minutes) 25     Tee Perry, PT, DPT

## 2018-09-04 ENCOUNTER — HOSPITAL ENCOUNTER (OUTPATIENT)
Dept: PHYSICAL THERAPY | Facility: CLINIC | Age: 48
Setting detail: THERAPIES SERIES
End: 2018-09-04
Attending: FAMILY MEDICINE
Payer: COMMERCIAL

## 2018-09-04 PROCEDURE — 97112 NEUROMUSCULAR REEDUCATION: CPT | Mod: GP | Performed by: PHYSICAL THERAPIST

## 2018-09-04 PROCEDURE — 40000840 ZZHC STATISTIC PT VESTIBULAR VISIT: Performed by: PHYSICAL THERAPIST

## 2018-09-04 NOTE — PROGRESS NOTES
Malka Ontiveros   PHYSICAL THERAPY PROGRESS NOTE/DISCHARGE 12/6/18 09/04/18 0900   Signing Clinician's Name / Credentials   Signing clinician's name / credentials Kris Hoenk, KATALINA   Session Number   Session Number 2 visits - [facility]   Adult Goals   PT Eval Goals 1;2;3   Goal 1   Goal Identifier HEP   Goal Description Pt will be independent in HEP in order to achieve and maintain long term treatment goals.  (compliant with HEP)   Target Date 09/29/18   Date Met 09/04/18   Goal 2   Goal Identifier Work   Goal Description Pt will have decreased dizziness and be able to return to work.  (needs MD OK)   Target Date 10/24/18   Goal 3   Goal Identifier Driving   Goal Description Pt will be able to drive safely.   Target Date 10/24/18   Date Met 09/04/18   Subjective Report   Subjective Report thinks she is better, felt little off balance rolling to get out of bed, looking up into cupboard, sx began as constant for 3 days = vestibular hypofucntion, not BPPV   System Outcome Measures   Outcome Measures (not BPPV, it is hypofunction)   Dizziness Handicap Inventory (score out of 100) A decrease in score by 17.18 or greater indicates a clinically significant change in symptoms. 38   Neuromuscular Re-education   Minutes 17   Skilled Intervention Balance exercise instruction   Patient Response no sx   Treatment Detail standing balance Ft EO horiz  and vert head turns x10, FT EC horiz and vert head turns x10, gait with ohriz and vert head turns every 2-3 steps x25ft, no path deviation, no sx, review gaze stab 1X viewing pt was given   Infrared Goggle Exam or Frenzel Lense Exam   Luz-Hallpike (right) Negative   Luz-Hallpike (Left) Negative   [facility] Supine Roll Test (Right) Negative   HSCC Supine Roll Test (Left) Negative   Plan   Plan for next session cont indep, see MD , get OK to RTW  12/6/18 - no further contacts have been made and pt is discontinue per plan set   Total Session Time   Timed Code Treatment Minutes 17   Total  Treatment Time (sum of timed and untimed services) 17   Kris Hoenk, PT #[id number]  [facility]

## 2018-09-10 ENCOUNTER — OFFICE VISIT (OUTPATIENT)
Dept: FAMILY MEDICINE | Facility: CLINIC | Age: 48
End: 2018-09-10
Payer: COMMERCIAL

## 2018-09-10 VITALS
HEART RATE: 68 BPM | DIASTOLIC BLOOD PRESSURE: 80 MMHG | WEIGHT: 152.6 LBS | SYSTOLIC BLOOD PRESSURE: 132 MMHG | HEIGHT: 60 IN | BODY MASS INDEX: 29.96 KG/M2 | TEMPERATURE: 98.9 F

## 2018-09-10 DIAGNOSIS — H81.10 BENIGN PAROXYSMAL POSITIONAL VERTIGO, UNSPECIFIED LATERALITY: Primary | ICD-10-CM

## 2018-09-10 PROCEDURE — 99212 OFFICE O/P EST SF 10 MIN: CPT | Performed by: FAMILY MEDICINE

## 2018-09-10 NOTE — MR AVS SNAPSHOT
"              After Visit Summary   9/10/2018    Malka Ontiveros    MRN: [id number]           Patient Information     Date Of Birth          1970        Visit Information        Provider Department      9/10/2018 9:00 AM Shelton Floyd MD [facility]        Today's Diagnoses     Benign paroxysmal positional vertigo, unspecified laterality    -  1      Care Instructions    Patient declined AVS.          Follow-ups after your visit        Follow-up notes from your care team     Return if symptoms worsen or fail to improve.      Who to contact     If you have questions or need follow up information about today's clinic visit or your schedule please contact [facility] directly at [phone].  Normal or non-critical lab and imaging results will be communicated to you by MyChart, letter or phone within 4 business days after the clinic has received the results. If you do not hear from us within 7 days, please contact the clinic through MyChart or phone. If you have a critical or abnormal lab result, we will notify you by phone as soon as possible.  Submit refill requests through [employer] or call your pharmacy and they will forward the refill request to us. Please allow 3 business days for your refill to be completed.          Additional Information About Your Visit        MyChart Information     [employer] lets you send messages to your doctor, view your test results, renew your prescriptions, schedule appointments and more. To sign up, go to [email or web address] . Click on \"Log in\" on the left side of the screen, which will take you to the Welcome page. Then click on \"Sign up Now\" on the right side of the page.     You will be asked to enter the access code listed below, as well as some personal information. Please follow the directions to create your username and password.     Your access code is: [email or web address]  Expires: 2018 11:36 AM     Your access code will  in " 90 days. If you need help or a new code, please call your [place] clinic or [phone].        Care EveryWhere ID     This is your Care EveryWhere ID. This could be used by other organizations to access your [place] medical records  [id number]        Your Vitals Were     Pulse Temperature Height BMI (Body Mass Index)          68 98.9  F (37.2  C) (Tympanic) 5' (1.524 m) 29.8 kg/m2         Blood Pressure from Last 3 Encounters:   09/10/18 132/80   08/28/18 140/76   08/24/18 (!) 174/94    Weight from Last 3 Encounters:   09/10/18 152 lb 9.6 oz (69.2 kg)   08/28/18 140 lb (63.5 kg)   08/24/18 140 lb (63.5 kg)              Today, you had the following     No orders found for display       Primary Care Provider Fax #    Physician No Ref-Primary [phone]       No address on file        Equal Access to Services     PRAVEENA MURPHY : Hadii guido rodriguezo Soconsuelo, waaxda luqadaha, qaybta kaalmada adeegyada, danny blackman . So [facility] [phone].    ATENCIÓN: Si habla español, tiene a mckeon disposición servicios gratuitos de asistencia lingüística. Llame al [phone].    We comply with applicable federal civil rights laws and [place] laws. We do not discriminate on the basis of race, color, national origin, age, disability, sex, sexual orientation, or gender identity.            Thank you!     Thank you for choosing [facility]  for your care. Our goal is always to provide you with excellent care. Hearing back from our patients is one way we can continue to improve our services. Please take a few minutes to complete the written survey that you may receive in the mail after your visit with us. Thank you!             Your Updated Medication List - Protect others around you: Learn how to safely use, store and throw away your medicines at www.disposemymeds.org.          This list is accurate as of 9/10/18  1:24 PM.  Always use your most recent med list.                   Brand Name  Dispense Instructions for use Diagnosis    meclizine 25 MG tablet    ANTIVERT    30 tablet    Take 1 tablet (25 mg) by mouth 3 times daily as needed for dizziness    Benign paroxysmal positional vertigo, unspecified laterality       NO ACTIVE MEDICATIONS

## 2018-09-10 NOTE — PROGRESS NOTES
SUBJECTIVE:   Malka Ontiveros is a 48 year old female who presents to clinic today for the following health issues:  Chief Complaint   Patient presents with     Dizziness     Pt here for a recheck on vertigo.       Patient  is doing much better. She denies constant vertigo, dizziness, unsteady gait, headache or nausea.  Patient   went to PT on 8/29 & 9/4.  Continues to do exercises at home.  Patient would like to go back to work.    Problem list and histories reviewed & adjusted, as indicated.  Additional history: as documented    There is no problem list on file for this patient.    No past surgical history on file.    Social History   Substance Use Topics     Smoking status: Current Every Day Smoker     Packs/day: 0.50     Years: 30.00     Smokeless tobacco: Never Used     Alcohol use Yes      Comment: 3 times per month     Family History   Problem Relation Age of Onset     Brain Cancer Mother      Lung Cancer Mother      Pancreatic Cancer Father      Lung Cancer Father          Current Outpatient Prescriptions   Medication Sig Dispense Refill     meclizine (ANTIVERT) 25 MG tablet Take 1 tablet (25 mg) by mouth 3 times daily as needed for dizziness (Patient not taking: Reported on 9/10/2018) 30 tablet 1     NO ACTIVE MEDICATIONS        No Known Allergies    Reviewed and updated as needed this visit by clinical staff  Allergies       Reviewed and updated as needed this visit by Provider         ROS:  C: NEGATIVE for fever, chills, change in weight  I: NEGATIVE for worrisome rashes, moles or lesions  E: NEGATIVE for vision changes or irritation  R: NEGATIVE for significant cough or SOB  CV: NEGATIVE for chest pain, palpitations or peripheral edema  GI: NEGATIVE for nausea, abdominal pain, heartburn, or change in bowel habits  : NEGATIVE for frequency, dysuria, or hematuria  M: NEGATIVE for significant arthralgias or myalgia  N: NEGATIVE for weakness, dizziness or paresthesias  E: NEGATIVE for temperature  intolerance, skin/hair changes  H: NEGATIVE for bleeding problems    OBJECTIVE:                                                    /80  Pulse 68  Temp 98.9  F (37.2  C) (Tympanic)  Ht 5' (1.524 m)  Wt 152 lb 9.6 oz (69.2 kg)  BMI 29.8 kg/m2  Body mass index is 29.8 kg/(m^2).  GENERAL: alert and no distress, ambulatory w/o assist; gait normal  EYES: EOMI, no nystagmus, PERRLA  HENT: no nasal congestion, EAMs clear with tympanic membranes intact and non-erythematous bilaterally  NECK: supple, no tenderness, no adenopathy,  Thyroid not enlarged  RESP: lungs clear to auscultation - no rales, no rhonchi, no wheezes  CV: regular rates and rhythm, no murmur  MS: no edema  SKIN: no suspicious lesions, no rashes  NEURO: Patient is A and O X 3; Cranial nerves 2-12 intact;  Strength 5/5 all extremities; DTR: ++ x 4; Romberg negative; Sensory intact; no tremors No problems with motor coordination      Diagnostic test results:  Diagnostic Test Results:  Results for orders placed or performed during the hospital encounter of 08/24/18   CBC with platelets differential   Result Value Ref Range    WBC 8.4 4.0 - 11.0 10e9/L    RBC Count 4.61 3.8 - 5.2 10e12/L    Hemoglobin 14.0 11.7 - 15.7 g/dL    Hematocrit 41.5 35.0 - 47.0 %    MCV 90 78 - 100 fl    MCH 30.4 26.5 - 33.0 pg    MCHC 33.7 31.5 - 36.5 g/dL    RDW 12.5 10.0 - 15.0 %    Platelet Count 219 150 - 450 10e9/L    Diff Method Automated Method     % Neutrophils 55.0 %    % Lymphocytes 33.4 %    % Monocytes 8.0 %    % Eosinophils 2.5 %    % Basophils 0.6 %    % Immature Granulocytes 0.5 %    Nucleated RBCs 0 0 /100    Absolute Neutrophil 4.6 1.6 - 8.3 10e9/L    Absolute Lymphocytes 2.8 0.8 - 5.3 10e9/L    Absolute Monocytes 0.7 0.0 - 1.3 10e9/L    Absolute Eosinophils 0.2 0.0 - 0.7 10e9/L    Absolute Basophils 0.1 0.0 - 0.2 10e9/L    Abs Immature Granulocytes 0.0 0 - 0.4 10e9/L    Absolute Nucleated RBC 0.0    Basic metabolic panel   Result Value Ref Range    Sodium  140 133 - 144 mmol/L    Potassium 3.5 3.4 - 5.3 mmol/L    Chloride 109 94 - 109 mmol/L    Carbon Dioxide 26 20 - 32 mmol/L    Anion Gap 5 3 - 14 mmol/L    Glucose 91 70 - 99 mg/dL    Urea Nitrogen 10 7 - 30 mg/dL    Creatinine 0.57 0.52 - 1.04 mg/dL    GFR Estimate >90 >60 mL/min/1.7m2    GFR Estimate If Black >90 >60 mL/min/1.7m2    Calcium 8.9 8.5 - 10.1 mg/dL   UA with Microscopic   Result Value Ref Range    Color Urine Straw     Appearance Urine Clear     Glucose Urine Negative NEG^Negative mg/dL    Bilirubin Urine Negative NEG^Negative    Ketones Urine Negative NEG^Negative mg/dL    Specific Gravity Urine 1.005 1.003 - 1.035    Blood Urine Negative NEG^Negative    pH Urine 7.0 5.0 - 7.0 pH    Protein Albumin Urine Negative NEG^Negative mg/dL    Urobilinogen mg/dL 0.0 0.0 - 2.0 mg/dL    Nitrite Urine Negative NEG^Negative    Leukocyte Esterase Urine Negative NEG^Negative    Source Unspecified Urine     WBC Urine <1 0 - 5 /HPF    RBC Urine <1 0 - 2 /HPF    Bacteria Urine Few (A) NEG^Negative /HPF    Squamous Epithelial /HPF Urine 2 (H) 0 - 1 /HPF   HCG qualitative urine (UPT)   Result Value Ref Range    HCG Qual Urine Negative NEG^Negative   Urine Culture   Result Value Ref Range    Specimen Description Unspecified Urine     Special Requests Specimen received in preservative     Culture Micro       50,000 to 100,000 colonies/mL  mixed urogenital kt          ASSESSMENT/PLAN:                                                        ICD-10-CM    1. Benign paroxysmal positional vertigo, unspecified laterality H81.10      Resolved.  May return to work with no restrictions but patient was advised to gradually reintroduce tasks as she tolerates.  Return precautions discussed and given to patient.    Filled out return to work certification form for patient.    May use meclizine only if with constant vertigo.    Follow up with Provider - prn   There are no Patient Instructions on file for this visit.    Shelton Garcia  MD Mariel  [facility]

## 2018-10-29 ENCOUNTER — TELEPHONE (OUTPATIENT)
Dept: FAMILY MEDICINE | Facility: CLINIC | Age: 48
End: 2018-10-29

## 2018-10-29 NOTE — TELEPHONE ENCOUNTER
Panel Management Review        Composite cancer screening  Chart review shows that this patient is due/due soon for the following Pap Smear  Summary:    Patient is due/failing the following:   PAP    Action needed:   Patient needs office visit for pap smear.    Type of outreach:    Sent letter.    Questions for provider review:    None                                                                                                                                    Carli Ibrahim CMA

## 2018-10-29 NOTE — LETTER
October 29, 2018      Malka Ontiveros  [address] [place] [address]            Dr. Floyd's Care Team has been reviewing your chart and it appears that there are aspects of your care that could be improved. At this time you are due for a pap smear.  If you could plan to do that in the near future it would be very helpful.  We are trying to help our patients achieve their health care goals.    If you have any questions, please feel free to call the clinic at [phone].          Sincerely,        Shelton Floyd MD

## 2018-12-06 NOTE — ADDENDUM NOTE
Encounter addended by: Hoenk, Kris, PT on: 12/6/2018  7:06 AM<BR>     Actions taken: Sign clinical note, Episode resolved

## 2019-03-14 ENCOUNTER — TELEPHONE (OUTPATIENT)
Dept: FAMILY MEDICINE | Facility: CLINIC | Age: 49
End: 2019-03-14

## 2019-03-14 NOTE — LETTER
March 14, 2019      Malka Ontiveros  [address] [place] [address]          Dr. Floyd's Care Team has been reviewing your chart and it appears that there are aspects of your care that could be improved. At this time you are due for a pap smear.  If you could plan to do that in the near future it would be very helpful.  We are trying to help our patients achieve their health care goals.    If you have any questions, please feel free to call the clinic at [phone].          Sincerely,        Shelton Floyd MD

## 2019-08-15 ENCOUNTER — TELEPHONE (OUTPATIENT)
Dept: FAMILY MEDICINE | Facility: CLINIC | Age: 49
End: 2019-08-15

## 2019-08-15 NOTE — LETTER
August 15, 2019      Malka Ontiveros  [address] [place] [address]          Dr. lFoyd's Care Team has been reviewing your chart and it appears that there are aspects of your care that could be improved. At this time you are due for a pap smear.  If you could plan to do that in the near future it would be very helpful.  We are trying to help our patients achieve their health care goals.    If you have any questions, please feel free to call the clinic at [phone].        Sincerely,        Shelton Floyd MD

## 2019-09-12 ENCOUNTER — TRANSFERRED RECORDS (OUTPATIENT)
Dept: HEALTH INFORMATION MANAGEMENT | Facility: CLINIC | Age: 49
End: 2019-09-12